# Patient Record
Sex: FEMALE | Race: BLACK OR AFRICAN AMERICAN | NOT HISPANIC OR LATINO | Employment: FULL TIME | RURAL
[De-identification: names, ages, dates, MRNs, and addresses within clinical notes are randomized per-mention and may not be internally consistent; named-entity substitution may affect disease eponyms.]

---

## 2019-01-31 ENCOUNTER — HISTORICAL (OUTPATIENT)
Dept: ADMINISTRATIVE | Facility: HOSPITAL | Age: 54
End: 2019-01-31

## 2019-02-01 LAB
LAB AP GENERAL CAT - HISTORICAL: NORMAL
LAB AP INTERPRETATION/RESULT - HISTORICAL: NEGATIVE
LAB AP SPECIMEN ADEQUACY - HISTORICAL: NORMAL
LAB AP SPECIMEN SUBMITTED - HISTORICAL: NORMAL

## 2020-11-16 ENCOUNTER — HISTORICAL (OUTPATIENT)
Dept: ADMINISTRATIVE | Facility: HOSPITAL | Age: 55
End: 2020-11-16

## 2020-12-15 ENCOUNTER — HISTORICAL (OUTPATIENT)
Dept: ADMINISTRATIVE | Facility: HOSPITAL | Age: 55
End: 2020-12-15

## 2020-12-15 LAB
25(OH)D3 SERPL-MCNC: 19.5 NG/ML
ALBUMIN SERPL BCP-MCNC: 4.1 G/DL (ref 3.5–5)
ALBUMIN/GLOB SERPL: 1.5 {RATIO}
ALP SERPL-CCNC: 110 U/L (ref 46–118)
ALT SERPL W P-5'-P-CCNC: 49 U/L (ref 13–56)
ANION GAP SERPL CALCULATED.3IONS-SCNC: 10.5 MMOL/L (ref 7–16)
AST SERPL W P-5'-P-CCNC: 29 U/L (ref 15–37)
BACTERIA #/AREA URNS HPF: ABNORMAL /HPF
BASOPHILS # BLD AUTO: 0.03 X10E3/UL (ref 0–0.2)
BASOPHILS NFR BLD AUTO: 0.6 % (ref 0–1)
BILIRUB SERPL-MCNC: 0.5 MG/DL (ref 0–1.2)
BILIRUB UR QL STRIP: NEGATIVE MG/DL
BUN SERPL-MCNC: 9 MG/DL (ref 7–18)
BUN/CREAT SERPL: 11
CALCIUM SERPL-MCNC: 9.2 MG/DL (ref 8.5–10.1)
CHLORIDE SERPL-SCNC: 108 MMOL/L (ref 98–107)
CHOLEST SERPL-MCNC: 197 MG/DL
CHOLEST/HDLC SERPL: 4.5 {RATIO}
CLARITY UR: CLEAR
CO2 SERPL-SCNC: 28 MMOL/L (ref 21–32)
COLOR UR: YELLOW
CREAT SERPL-MCNC: 0.81 MG/DL (ref 0.5–1.02)
EOSINOPHIL # BLD AUTO: 0.08 X10E3/UL (ref 0–0.5)
EOSINOPHIL NFR BLD AUTO: 1.7 % (ref 1–4)
ERYTHROCYTE [DISTWIDTH] IN BLOOD BY AUTOMATED COUNT: 12.9 % (ref 11.5–14.5)
GLOBULIN SER-MCNC: 2.7 G/DL (ref 2–4)
GLUCOSE SERPL-MCNC: 90 MG/DL (ref 74–106)
GLUCOSE UR STRIP-MCNC: NEGATIVE MG/DL
HCT VFR BLD AUTO: 41.4 % (ref 38–47)
HDLC SERPL-MCNC: 44 MG/DL
HGB BLD-MCNC: 13.5 G/DL (ref 12–16)
IMM GRANULOCYTES # BLD AUTO: 0.01 X10E3/UL (ref 0–0.04)
IMM GRANULOCYTES NFR BLD: 0.2 % (ref 0–0.4)
KETONES UR STRIP-SCNC: NEGATIVE MG/DL
LDLC SERPL CALC-MCNC: 119 MG/DL
LEUKOCYTE ESTERASE UR QL STRIP: NEGATIVE LEU/UL
LYMPHOCYTES # BLD AUTO: 1 X10E3/UL (ref 1–4.8)
LYMPHOCYTES NFR BLD AUTO: 21.6 % (ref 27–41)
MCH RBC QN AUTO: 28 PG (ref 27–31)
MCHC RBC AUTO-ENTMCNC: 32.6 G/DL (ref 32–36)
MCV RBC AUTO: 85.9 FL (ref 80–96)
MONOCYTES # BLD AUTO: 0.61 X10E3/UL (ref 0–0.8)
MONOCYTES NFR BLD AUTO: 13.2 % (ref 2–6)
MPC BLD CALC-MCNC: 10.4 FL (ref 9.4–12.4)
NEUTROPHILS # BLD AUTO: 2.9 X10E3/UL (ref 1.8–7.7)
NEUTROPHILS NFR BLD AUTO: 62.7 % (ref 53–65)
NITRITE UR QL STRIP: NEGATIVE
NRBC # BLD AUTO: 0 X10E3/UL (ref 0–0)
NRBC, AUTO (.00): 0 /100 (ref 0–0)
PH UR STRIP: 5.5 PH UNITS (ref 5–8)
PLATELET # BLD AUTO: 255 X10E3/UL (ref 150–400)
POTASSIUM SERPL-SCNC: 3.5 MMOL/L (ref 3.5–5.1)
PROT SERPL-MCNC: 6.8 G/DL (ref 6.4–8.2)
PROT UR QL STRIP: NEGATIVE MG/DL
RBC # BLD AUTO: 4.82 X10E6/UL (ref 4.2–5.4)
RBC # UR STRIP: NEGATIVE ERY/UL
SODIUM SERPL-SCNC: 143 MMOL/L (ref 136–145)
SP GR UR STRIP: 1.02 (ref 1–1.03)
SQUAMOUS #/AREA URNS LPF: ABNORMAL /LPF
TRIGL SERPL-MCNC: 170 MG/DL
UROBILINOGEN UR STRIP-ACNC: 0.2 EU/DL
WBC # BLD AUTO: 4.63 X10E3/UL (ref 4.5–11)
WBC #/AREA URNS HPF: ABNORMAL /HPF (ref 0–5)

## 2020-12-18 LAB
INSULIN SERPL-ACNC: NORMAL U[IU]/ML
LAB AP COMMENTS: NORMAL
LAB AP GENERAL CAT - HISTORICAL: NORMAL
LAB AP INTERPRETATION/RESULT - HISTORICAL: NEGATIVE
LAB AP SPECIMEN ADEQUACY - HISTORICAL: NORMAL
LAB AP SPECIMEN SUBMITTED - HISTORICAL: NORMAL

## 2021-08-17 ENCOUNTER — HOSPITAL ENCOUNTER (OUTPATIENT)
Dept: RADIOLOGY | Facility: HOSPITAL | Age: 56
Discharge: HOME | End: 2021-08-17
Attending: OBSTETRICS & GYNECOLOGY
Payer: COMMERCIAL

## 2021-08-17 VITALS — HEIGHT: 70 IN | WEIGHT: 215 LBS | BODY MASS INDEX: 30.78 KG/M2

## 2021-08-17 DIAGNOSIS — Z12.31 ENCOUNTER FOR SCREENING MAMMOGRAM FOR MALIGNANT NEOPLASM OF BREAST: ICD-10-CM

## 2021-08-17 PROCEDURE — 77067 SCR MAMMO BI INCL CAD: CPT | Mod: TC

## 2021-10-26 ENCOUNTER — OFFICE VISIT (OUTPATIENT)
Dept: FAMILY MEDICINE | Facility: CLINIC | Age: 56
End: 2021-10-26
Payer: COMMERCIAL

## 2021-10-26 VITALS
TEMPERATURE: 97 F | OXYGEN SATURATION: 99 % | HEART RATE: 70 BPM | RESPIRATION RATE: 18 BRPM | SYSTOLIC BLOOD PRESSURE: 116 MMHG | DIASTOLIC BLOOD PRESSURE: 73 MMHG

## 2021-10-26 DIAGNOSIS — Z20.828 CONTACT WITH OR EXPOSURE TO VIRAL DISEASE: Primary | ICD-10-CM

## 2021-10-26 DIAGNOSIS — J01.00 ACUTE NON-RECURRENT MAXILLARY SINUSITIS: ICD-10-CM

## 2021-10-26 LAB
CTP QC/QA: YES
FLUAV AG NPH QL: NEGATIVE
FLUBV AG NPH QL: NEGATIVE
SARS-COV-2 AG RESP QL IA.RAPID: NEGATIVE

## 2021-10-26 PROCEDURE — 96372 THER/PROPH/DIAG INJ SC/IM: CPT | Mod: ,,, | Performed by: NURSE PRACTITIONER

## 2021-10-26 PROCEDURE — 87428 POCT SARS-COV2 (COVID) WITH FLU ANTIGEN: ICD-10-PCS | Mod: QW,,, | Performed by: NURSE PRACTITIONER

## 2021-10-26 PROCEDURE — 3078F DIAST BP <80 MM HG: CPT | Mod: CPTII,,, | Performed by: NURSE PRACTITIONER

## 2021-10-26 PROCEDURE — 96372 PR INJECTION,THERAP/PROPH/DIAG2ST, IM OR SUBCUT: ICD-10-PCS | Mod: ,,, | Performed by: NURSE PRACTITIONER

## 2021-10-26 PROCEDURE — 3078F PR MOST RECENT DIASTOLIC BLOOD PRESSURE < 80 MM HG: ICD-10-PCS | Mod: CPTII,,, | Performed by: NURSE PRACTITIONER

## 2021-10-26 PROCEDURE — 99203 PR OFFICE/OUTPT VISIT, NEW, LEVL III, 30-44 MIN: ICD-10-PCS | Mod: 25,,, | Performed by: NURSE PRACTITIONER

## 2021-10-26 PROCEDURE — 3074F PR MOST RECENT SYSTOLIC BLOOD PRESSURE < 130 MM HG: ICD-10-PCS | Mod: CPTII,,, | Performed by: NURSE PRACTITIONER

## 2021-10-26 PROCEDURE — 3074F SYST BP LT 130 MM HG: CPT | Mod: CPTII,,, | Performed by: NURSE PRACTITIONER

## 2021-10-26 PROCEDURE — 1159F PR MEDICATION LIST DOCUMENTED IN MEDICAL RECORD: ICD-10-PCS | Mod: CPTII,,, | Performed by: NURSE PRACTITIONER

## 2021-10-26 PROCEDURE — 87428 SARSCOV & INF VIR A&B AG IA: CPT | Mod: QW,,, | Performed by: NURSE PRACTITIONER

## 2021-10-26 PROCEDURE — 99203 OFFICE O/P NEW LOW 30 MIN: CPT | Mod: 25,,, | Performed by: NURSE PRACTITIONER

## 2021-10-26 PROCEDURE — 1159F MED LIST DOCD IN RCRD: CPT | Mod: CPTII,,, | Performed by: NURSE PRACTITIONER

## 2021-10-26 RX ORDER — CEFDINIR 300 MG/1
300 CAPSULE ORAL 2 TIMES DAILY
Qty: 20 CAPSULE | Refills: 0 | Status: SHIPPED | OUTPATIENT
Start: 2021-10-26 | End: 2021-11-05

## 2021-10-26 RX ORDER — DEXBROMPHENIRAMINE MALEATE, PHENYLEPHRINE HYDROCHLORIDE 2; 7.5 MG/1; MG/1
1 TABLET ORAL
Qty: 20 TABLET | Refills: 0 | Status: SHIPPED | OUTPATIENT
Start: 2021-10-26 | End: 2023-04-10

## 2021-10-26 RX ORDER — DEXAMETHASONE SODIUM PHOSPHATE 4 MG/ML
6 INJECTION, SOLUTION INTRA-ARTICULAR; INTRALESIONAL; INTRAMUSCULAR; INTRAVENOUS; SOFT TISSUE ONCE
Status: COMPLETED | OUTPATIENT
Start: 2021-10-26 | End: 2021-10-26

## 2021-10-26 RX ORDER — CEFTRIAXONE 1 G/1
1 INJECTION, POWDER, FOR SOLUTION INTRAMUSCULAR; INTRAVENOUS
Status: COMPLETED | OUTPATIENT
Start: 2021-10-26 | End: 2021-10-26

## 2021-10-26 RX ADMIN — CEFTRIAXONE 1 G: 1 INJECTION, POWDER, FOR SOLUTION INTRAMUSCULAR; INTRAVENOUS at 09:10

## 2021-10-26 RX ADMIN — DEXAMETHASONE SODIUM PHOSPHATE 6 MG: 4 INJECTION, SOLUTION INTRA-ARTICULAR; INTRALESIONAL; INTRAMUSCULAR; INTRAVENOUS; SOFT TISSUE at 09:10

## 2022-01-31 PROBLEM — J01.00 ACUTE NON-RECURRENT MAXILLARY SINUSITIS: Status: RESOLVED | Noted: 2021-10-26 | Resolved: 2022-01-31

## 2023-04-10 ENCOUNTER — OFFICE VISIT (OUTPATIENT)
Dept: FAMILY MEDICINE | Facility: CLINIC | Age: 58
End: 2023-04-10
Payer: COMMERCIAL

## 2023-04-10 VITALS
WEIGHT: 203 LBS | DIASTOLIC BLOOD PRESSURE: 81 MMHG | OXYGEN SATURATION: 99 % | SYSTOLIC BLOOD PRESSURE: 125 MMHG | TEMPERATURE: 99 F | BODY MASS INDEX: 29.06 KG/M2 | RESPIRATION RATE: 20 BRPM | HEART RATE: 75 BPM | HEIGHT: 70 IN

## 2023-04-10 DIAGNOSIS — R63.4 WEIGHT LOSS: ICD-10-CM

## 2023-04-10 DIAGNOSIS — Z23 NEED FOR PNEUMOCOCCAL VACCINE: ICD-10-CM

## 2023-04-10 DIAGNOSIS — F32.A DEPRESSIVE DISORDER: Primary | ICD-10-CM

## 2023-04-10 DIAGNOSIS — F17.210 LIGHT CIGARETTE SMOKER (1-9 CIGARETTES PER DAY): ICD-10-CM

## 2023-04-10 DIAGNOSIS — Z23 FLU VACCINE NEED: ICD-10-CM

## 2023-04-10 DIAGNOSIS — Z11.59 NEED FOR HEPATITIS C SCREENING TEST: ICD-10-CM

## 2023-04-10 DIAGNOSIS — R53.83 OTHER FATIGUE: ICD-10-CM

## 2023-04-10 LAB
ALBUMIN SERPL BCP-MCNC: 3.8 G/DL (ref 3.5–5)
ALBUMIN/GLOB SERPL: 1.5 {RATIO}
ALP SERPL-CCNC: 115 U/L (ref 46–118)
ALT SERPL W P-5'-P-CCNC: 40 U/L (ref 13–56)
ANION GAP SERPL CALCULATED.3IONS-SCNC: 9 MMOL/L (ref 7–16)
AST SERPL W P-5'-P-CCNC: 34 U/L (ref 15–37)
BASOPHILS # BLD AUTO: 0.03 K/UL (ref 0–0.2)
BASOPHILS NFR BLD AUTO: 0.5 % (ref 0–1)
BILIRUB SERPL-MCNC: 0.3 MG/DL (ref ?–1.2)
BUN SERPL-MCNC: 11 MG/DL (ref 7–18)
BUN/CREAT SERPL: 17 (ref 6–20)
CALCIUM SERPL-MCNC: 9.2 MG/DL (ref 8.5–10.1)
CHLORIDE SERPL-SCNC: 112 MMOL/L (ref 98–107)
CO2 SERPL-SCNC: 28 MMOL/L (ref 21–32)
CREAT SERPL-MCNC: 0.66 MG/DL (ref 0.55–1.02)
DIFFERENTIAL METHOD BLD: ABNORMAL
EGFR (NO RACE VARIABLE) (RUSH/TITUS): 101 ML/MIN/1.73M²
EOSINOPHIL # BLD AUTO: 0.11 K/UL (ref 0–0.5)
EOSINOPHIL NFR BLD AUTO: 1.9 % (ref 1–4)
ERYTHROCYTE [DISTWIDTH] IN BLOOD BY AUTOMATED COUNT: 12.4 % (ref 11.5–14.5)
GLOBULIN SER-MCNC: 2.5 G/DL (ref 2–4)
GLUCOSE SERPL-MCNC: 90 MG/DL (ref 74–106)
HCT VFR BLD AUTO: 43.4 % (ref 38–47)
HCV AB SER QL: NORMAL
HGB BLD-MCNC: 14.4 G/DL (ref 12–16)
IMM GRANULOCYTES # BLD AUTO: 0.02 K/UL (ref 0–0.04)
IMM GRANULOCYTES NFR BLD: 0.4 % (ref 0–0.4)
LYMPHOCYTES # BLD AUTO: 1.96 K/UL (ref 1–4.8)
LYMPHOCYTES NFR BLD AUTO: 34.4 % (ref 27–41)
MCH RBC QN AUTO: 28.5 PG (ref 27–31)
MCHC RBC AUTO-ENTMCNC: 33.2 G/DL (ref 32–36)
MCV RBC AUTO: 85.8 FL (ref 80–96)
MONOCYTES # BLD AUTO: 0.44 K/UL (ref 0–0.8)
MONOCYTES NFR BLD AUTO: 7.7 % (ref 2–6)
MPC BLD CALC-MCNC: 10.1 FL (ref 9.4–12.4)
NEUTROPHILS # BLD AUTO: 3.13 K/UL (ref 1.8–7.7)
NEUTROPHILS NFR BLD AUTO: 55.1 % (ref 53–65)
NRBC # BLD AUTO: 0 X10E3/UL
NRBC, AUTO (.00): 0 %
PLATELET # BLD AUTO: 261 K/UL (ref 150–400)
POTASSIUM SERPL-SCNC: 4.1 MMOL/L (ref 3.5–5.1)
PROT SERPL-MCNC: 6.3 G/DL (ref 6.4–8.2)
RBC # BLD AUTO: 5.06 M/UL (ref 4.2–5.4)
SODIUM SERPL-SCNC: 145 MMOL/L (ref 136–145)
TSH SERPL DL<=0.005 MIU/L-ACNC: 2.01 UIU/ML (ref 0.36–3.74)
WBC # BLD AUTO: 5.69 K/UL (ref 4.5–11)

## 2023-04-10 PROCEDURE — 86803 HEPATITIS C ANTIBODY: ICD-10-PCS | Mod: ,,, | Performed by: CLINICAL MEDICAL LABORATORY

## 2023-04-10 PROCEDURE — 1160F RVW MEDS BY RX/DR IN RCRD: CPT | Mod: ,,, | Performed by: NURSE PRACTITIONER

## 2023-04-10 PROCEDURE — 1160F PR REVIEW ALL MEDS BY PRESCRIBER/CLIN PHARMACIST DOCUMENTED: ICD-10-PCS | Mod: ,,, | Performed by: NURSE PRACTITIONER

## 2023-04-10 PROCEDURE — 80050 GENERAL HEALTH PANEL: CPT | Mod: ,,, | Performed by: CLINICAL MEDICAL LABORATORY

## 2023-04-10 PROCEDURE — 90472 PNEUMOCOCCAL CONJUGATE VACCINE 20-VALENT: ICD-10-PCS | Mod: ,,, | Performed by: NURSE PRACTITIONER

## 2023-04-10 PROCEDURE — 3008F BODY MASS INDEX DOCD: CPT | Mod: ,,, | Performed by: NURSE PRACTITIONER

## 2023-04-10 PROCEDURE — 90472 IMMUNIZATION ADMIN EACH ADD: CPT | Mod: ,,, | Performed by: NURSE PRACTITIONER

## 2023-04-10 PROCEDURE — 3074F PR MOST RECENT SYSTOLIC BLOOD PRESSURE < 130 MM HG: ICD-10-PCS | Mod: ,,, | Performed by: NURSE PRACTITIONER

## 2023-04-10 PROCEDURE — 99214 OFFICE O/P EST MOD 30 MIN: CPT | Mod: 25,,, | Performed by: NURSE PRACTITIONER

## 2023-04-10 PROCEDURE — 80050 PR  GENERAL HEALTH PANEL: ICD-10-PCS | Mod: ,,, | Performed by: CLINICAL MEDICAL LABORATORY

## 2023-04-10 PROCEDURE — 3079F DIAST BP 80-89 MM HG: CPT | Mod: ,,, | Performed by: NURSE PRACTITIONER

## 2023-04-10 PROCEDURE — 99214 PR OFFICE/OUTPT VISIT, EST, LEVL IV, 30-39 MIN: ICD-10-PCS | Mod: 25,,, | Performed by: NURSE PRACTITIONER

## 2023-04-10 PROCEDURE — 90686 IIV4 VACC NO PRSV 0.5 ML IM: CPT | Mod: ,,, | Performed by: NURSE PRACTITIONER

## 2023-04-10 PROCEDURE — 90677 PCV20 VACCINE IM: CPT | Mod: ,,, | Performed by: NURSE PRACTITIONER

## 2023-04-10 PROCEDURE — 1159F PR MEDICATION LIST DOCUMENTED IN MEDICAL RECORD: ICD-10-PCS | Mod: ,,, | Performed by: NURSE PRACTITIONER

## 2023-04-10 PROCEDURE — 3008F PR BODY MASS INDEX (BMI) DOCUMENTED: ICD-10-PCS | Mod: ,,, | Performed by: NURSE PRACTITIONER

## 2023-04-10 PROCEDURE — 90471 IMMUNIZATION ADMIN: CPT | Mod: ,,, | Performed by: NURSE PRACTITIONER

## 2023-04-10 PROCEDURE — 3079F PR MOST RECENT DIASTOLIC BLOOD PRESSURE 80-89 MM HG: ICD-10-PCS | Mod: ,,, | Performed by: NURSE PRACTITIONER

## 2023-04-10 PROCEDURE — 90686 FLU VACCINE (QUAD) GREATER THAN OR EQUAL TO 3YO PRESERVATIVE FREE IM: ICD-10-PCS | Mod: ,,, | Performed by: NURSE PRACTITIONER

## 2023-04-10 PROCEDURE — 3074F SYST BP LT 130 MM HG: CPT | Mod: ,,, | Performed by: NURSE PRACTITIONER

## 2023-04-10 PROCEDURE — 86803 HEPATITIS C AB TEST: CPT | Mod: ,,, | Performed by: CLINICAL MEDICAL LABORATORY

## 2023-04-10 PROCEDURE — 90471 FLU VACCINE (QUAD) GREATER THAN OR EQUAL TO 3YO PRESERVATIVE FREE IM: ICD-10-PCS | Mod: ,,, | Performed by: NURSE PRACTITIONER

## 2023-04-10 PROCEDURE — 1159F MED LIST DOCD IN RCRD: CPT | Mod: ,,, | Performed by: NURSE PRACTITIONER

## 2023-04-10 PROCEDURE — 90677 PNEUMOCOCCAL CONJUGATE VACCINE 20-VALENT: ICD-10-PCS | Mod: ,,, | Performed by: NURSE PRACTITIONER

## 2023-04-10 RX ORDER — BUPROPION HYDROCHLORIDE 150 MG/1
150 TABLET ORAL DAILY
Qty: 30 TABLET | Refills: 1 | Status: SHIPPED | OUTPATIENT
Start: 2023-04-10 | End: 2023-05-25 | Stop reason: DRUGHIGH

## 2023-04-10 NOTE — PATIENT INSTRUCTIONS
"Patient Education       Quitting Smoking   The Basics   Written by the doctors and editors at [facility]   What are the benefits of quitting smoking? -- Quitting smoking can dramatically improve your health and help you live longer. It lowers your risk of heart disease, lung disease, kidney failure, infection, and cancer.   Quitting smoking can also lower your chances of getting osteoporosis, a condition that makes your bones weak. Plus, it can help your skin look younger and reduce the chances that you will have problems with sex.  Quitting is not easy for most people, and it can take several tries to completely quit. But help and support are available. Quitting smoking will improve your health no matter how old you are, even if you have smoked for a long time.   What should I do if I want to quit smoking? -- It's a good idea to start by talking with your doctor or nurse. It is possible to quit on your own, without help. But getting help greatly increases your chances of quitting successfully.  When you are ready to quit, you will make a plan to:  Set a quit date  Tell your family and friends that you plan to quit  Plan ahead for the challenges you will face, such as cigarette cravings  Remove cigarettes from your home, car, and work  How can my doctor or nurse help? -- Your doctor or nurse can give you advice on the best way to quit. They can also give you medicines to:  Reduce your craving for cigarettes  Reduce your "withdrawal" symptoms (symptoms that happen when you stop smoking)  Your doctor or nurse can also help you find a counselor to talk to. For most people who are trying to quit smoking, it works best to use both medicines and counseling.  You can also get help from a free phone line (7-950-QUIT-NOW or 1-652.833.7436) or go online to www.smokefree.gov.  What are the symptoms of withdrawal? -- When you stop smoking, you might have symptoms such as:  Trouble sleeping  Feeling irritable, anxious, or " restless  Getting frustrated or angry  Having trouble thinking clearly  These symptoms can be hard to deal with, which is why it can be so hard to quit. But medicines can help.  Some people who stop smoking become temporarily depressed. Some people need treatment for depression, such as counseling or medicines or both. People with depression might:  No longer enjoy or care about doing the things they used to like to do  Feel sad, down, hopeless, nervous, or cranky most of the day, almost every day  Lose or gain weight  Sleep too much or too little  Feel tired or like they have no energy  Feel guilty or like they are worth nothing  Forget things or feel confused  Move and speak more slowly than usual  Act restless or have trouble staying still  Think about death or suicide  If you think you might be depressed, tell your doctor or nurse right away. They can talk to you about your symptoms and recommend treatment if needed.  If you ever feel like you might hurt yourself, go straight to the nearest emergency department or call for an ambulance (in the [place] and [place], dial 9-1-1). You can also call your doctor or nurse and tell them it is an emergency, or reach the  National Suicide Prevention Lifeline at 1-939.118.2406 or www.suicidepreventionlifeline.org.  How does counseling work? -- A counselor can help you figure out:  What triggers you to want to smoke, and how to handle these situations  How to resist cravings  What you can do differently if you have tried to quit before  You can meet with a counselor in one-on-one sessions or as part of a group. There are other ways to get counseling, too, such as over the phone, through text messaging, or online.   How do medicines help you stop smoking? -- Different medicines work in different ways:  Nicotine replacement therapy - Nicotine is the main drug in cigarettes, and the reason they are addictive. These medicines reduce your body's craving for nicotine. They also help  "with withdrawal symptoms.  There are different forms of nicotine replacement, including skin patches, lozenges, gum, nasal sprays, and inhalers. Most can be bought without a prescription. Also, health insurance might cover some or all of the cost.  It often helps to use 2 forms of nicotine replacement. For example, you might wear a patch all the time, plus use gum or lozenges when you get a craving to smoke.  Varenicline - Varenicline (brand names: Chantix, Champix) is a prescription medicine that reduces withdrawal symptoms and cigarette cravings. Varenicline can increase the effects of alcohol in some people. It's a good idea to limit drinking while you're taking it, at least until you know how it affects you.  Even if you are not yet ready to commit to a quit date, varenicline can help reduce cravings. This can make it easier to quit when you are ready.  Bupropion - Bupropion (sample brand names: Wellbutrin, Zyban) is a prescription medicine that reduces your desire to smoke. It is also available in a generic version, which is cheaper than the brand name medicines. Doctors do not usually prescribe bupropion for people with seizures or who have had seizures in the past.  It might also be helpful to combine nicotine replacement with bupropion or varenicline. In some cases, a person might even take both bupropion and varenicline. Your doctor or nurse can help you figure out the best combination for you.  What about e-cigarettes? -- Sometimes people wonder if using electronic cigarettes, or "e-cigarettes," can help them quit smoking. Using e-cigarettes is also called "vaping." Doctors do not recommend e-cigarettes in place of medicines and counseling. That's because e-cigarettes still contain nicotine as well as other substances that might be harmful. It's also not clear how they can affect a person's health in the long term.  What if I am pregnant and I smoke? -- If you are pregnant, it's really important for the " health of your baby that you quit. Ask your doctor what options you have, and what is safest for your baby.  Will I gain weight if I quit? -- You might gain a few pounds. This can be frustrating for some people, but it's important to remember that you are improving your health by quitting smoking. You can help prevent gaining a lot of weight by staying active and eating a healthy diet.  What if I am not able to quit? -- If you don't quit on your first try, or if you quit but then start smoking again, don't give up hope. Lots of people have to try more than once before they are able to completely quit.  It might help to try to understand why quitting did not work. There might be something you can do differently when you try again. It can help to figure out what situations make you want to smoke, so you can avoid them.   What else can I do to improve my chances of quitting? -- You can:  Get regular exercise. Any type of physical activity, even gentle forms of movement, is good for your health. Physical activity can also help reduce stress.  Stay away from smokers and places that make you want to smoke. If people close to you smoke, ask them to quit with you or avoid smoking around you.  Carry gum, hard candy, or something to put in your mouth. If you get a craving for a cigarette, try one of these instead.  Don't give up, even if you start smoking again. It takes most people a few tries before they succeed.  All topics are updated as new evidence becomes available and our peer review process is complete.  This topic retrieved from [employer] on: Sep 21, 2021.  Topic 59494 Version 22.0  Release: 29.4.2 - C29.263  © 2021 UpToDate, Inc. and/or its affiliates. All rights reserved.  Consumer Information Use and Disclaimer   This information is not specific medical advice and does not replace information you receive from your health care provider. This is only a brief summary of general information. It does NOT include all  information about conditions, illnesses, injuries, tests, procedures, treatments, therapies, discharge instructions or life-style choices that may apply to you. You must talk with your health care provider for complete information about your health and treatment options. This information should not be used to decide whether or not to accept your health care provider's advice, instructions or recommendations. Only your health care provider has the knowledge and training to provide advice that is right for you. The use of this information is governed by the [employer] End User License Agreement, available at [email or web address].The use of [employer] content is governed by the [employer] Terms of Use. ©2021 [employer] All rights reserved.  Copyright   © 2021 UpToDate, Inc. and/or its affiliates. All rights reserved.

## 2023-04-10 NOTE — PROGRESS NOTES
[facility] FAMILY MEDICINE       PATIENT NAME: Imelda Anderson   : 1965    AGE: 57 y.o. DATE OF ENCOUNTER: 4/10/23    MRN: [id number]      PCP: Daron Casillas MD    Reason for Visit / Chief Complaint:  Establish Care (Patient presents to the clinic establish care and  last 2wks dizziness loss weight)         274}    Subjective:     HPI:    Presents to establish PCP.  Last labs > 2 yrs ago per Dr. Casillas.  Brother  2022 & Mom  2022; has been a struggle since then with depression & sadness.  Bought new mobile home but having a lot of probs w/ it, has been stressful.  Over past couple of weeks, having dizzy spells, losing wt.  Doesn't want to do anything on days off.  Thoughts of dying but no thoughts or plans of suicide.  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  Had quit smoking x 2 yrs & started back after mom passed.  PHQ-9 Score: 14    Review of Systems:   Review of Systems   Constitutional:  Positive for appetite change, fatigue and unexpected weight change.   HENT: Negative.     Respiratory: Negative.     Cardiovascular: Negative.    Gastrointestinal: Negative.    Genitourinary: Negative.    Skin: Negative.    Neurological:  Positive for dizziness. Negative for headaches (occasionally).   Psychiatric/Behavioral:  Positive for dysphoric mood. Negative for self-injury and suicidal ideas. The patient is nervous/anxious.      Allergies and Meds: 274}   Review of patient's allergies indicates:  No Known Allergies     Current Outpatient Medications:     buPROPion (WELLBUTRIN XL) 150 MG TB24 tablet, Take 1 tablet (150 mg total) by mouth once daily., Disp: 30 tablet, Rfl: 1    Labs:274}    I have reviewed old labs below:  Lab Results   Component Value Date    WBC 4.63 12/15/2020    RBC 4.82 12/15/2020    HGB 13.5 12/15/2020    HCT 41.4 12/15/2020     12/15/2020     12/15/2020    K 3.5 12/15/2020     (H) 12/15/2020    CALCIUM 9.2 12/15/2020    GLU 90 12/15/2020  "   BUN 9 12/15/2020    CREATININE 0.810 12/15/2020    ESTGFRAFRICA 94 12/15/2020    EGFRNONAA 77 12/15/2020    ALT 49 12/15/2020    AST 29 12/15/2020    CHOL 197 12/15/2020    TRIG 170 12/15/2020    HDL 44 12/15/2020    LDLCALC 119 12/15/2020       Medical History: 274}   History reviewed. No pertinent past medical history.   Social History     Tobacco Use   Smoking Status Every Day    Packs/day: 0.25    Years: 15.00    Pack years: 3.75    Types: Cigarettes   Smokeless Tobacco Current      Past Surgical History:   Procedure Laterality Date    HYSTERECTOMY      TUBAL LIGATION  2010      Objective:  274}   /81 (BP Location: Left arm, Patient Position: Sitting, BP Method: Medium (Manual))   Pulse 75   Temp 98.6 °F (37 °C) (Oral)   Resp 20   Ht 5' 10" (1.778 m)   Wt 92.1 kg (203 lb)   SpO2 99%   BMI 29.13 kg/m²     Wt Readings from Last 3 Encounters:   04/10/23 92.1 kg (203 lb)   08/17/21 97.5 kg (215 lb)     BP Readings from Last 3 Encounters:   04/10/23 125/81   10/26/21 116/73     Body mass index is 29.13 kg/m².     Physical Exam  Vitals and nursing note reviewed.   Constitutional:       General: She is not in acute distress.     Appearance: Normal appearance.   HENT:      Head: Normocephalic.      Right Ear: Tympanic membrane, ear canal and external ear normal.      Left Ear: Tympanic membrane, ear canal and external ear normal.      Nose: Nose normal.      Mouth/Throat:      Mouth: Mucous membranes are moist.      Pharynx: Oropharynx is clear.   Eyes:      Conjunctiva/sclera: Conjunctivae normal.      Pupils: Pupils are equal, round, and reactive to light.   Neck:      Thyroid: No thyroid mass or thyromegaly.      Vascular: Normal carotid pulses. No carotid bruit.   Cardiovascular:      Rate and Rhythm: Normal rate and regular rhythm.      Pulses: Normal pulses.      Heart sounds: Normal heart sounds.   Pulmonary:      Effort: Pulmonary effort is normal.      Breath sounds: Normal breath sounds. "   Abdominal:      Palpations: Abdomen is soft.      Tenderness: There is no abdominal tenderness.   Musculoskeletal:      Cervical back: Neck supple.      Right lower leg: No edema.      Left lower leg: No edema.   Lymphadenopathy:      Cervical: No cervical adenopathy.   Skin:     General: Skin is warm and dry.   Neurological:      General: No focal deficit present.      Mental Status: She is alert and oriented to person, place, and time.   Psychiatric:         Mood and Affect: Mood normal.         Behavior: Behavior normal.        Assessment and Plan: 274}     1. Depressive disorder  Comments:  Start Wellbutrin  mg every morning, f/u 4-6 weeks.  Orders:  -     CBC Auto Differential; Future; Expected date: 04/10/2023  -     Comprehensive Metabolic Panel; Future; Expected date: 04/10/2023  -     TSH; Future; Expected date: 04/10/2023  -     buPROPion (WELLBUTRIN XL) 150 MG TB24 tablet; Take 1 tablet (150 mg total) by mouth once daily.  Dispense: 30 tablet; Refill: 1    2. Other fatigue  -     CBC Auto Differential; Future; Expected date: 04/10/2023  -     Comprehensive Metabolic Panel; Future; Expected date: 04/10/2023  -     TSH; Future; Expected date: 04/10/2023  -     X-Ray Chest PA And Lateral; Future; Expected date: 04/10/2023    3. Weight loss  -     CBC Auto Differential; Future; Expected date: 04/10/2023  -     Comprehensive Metabolic Panel; Future; Expected date: 04/10/2023  -     X-Ray Chest PA And Lateral; Future; Expected date: 04/10/2023    4. Need for hepatitis C screening test  -     Hepatitis C Antibody; Future; Expected date: 04/10/2023    5. Light cigarette smoker (1-9 cigarettes per day)  -     X-Ray Chest PA And Lateral; Future; Expected date: 04/10/2023    6. Need for pneumococcal vaccine  -     Pneumococcal Conjugate Vaccine (20 Valent) (IM)    7. Flu vaccine need  -     Influenza - Quadrivalent (PF)    Declines HIV screening.  Advised to quit smoking.  Prevnar-20 today  Flu vaccine  today  Took 1st Moderna vaccines & 1 booster, not sure about getting further booster doses (doesn't have card with her).  Not UTD on mammo, no previous colonoscopy.  Will address other care gaps at next visit.    Return to clinic 4-6 wks f/u depression, start of wellbutrin/Healthy You with fasting lab (could fast after bfast); and sooner as needed.    Future Appointments   Date Time Provider Department Center   5/25/2023  3:40 PM AMY Victoria [facility] SALLY Cruz          Signature:  AMY [facility]

## 2023-05-24 ENCOUNTER — TELEPHONE (OUTPATIENT)
Dept: FAMILY MEDICINE | Facility: CLINIC | Age: 58
End: 2023-05-24
Payer: COMMERCIAL

## 2023-05-24 DIAGNOSIS — Z13.220 SCREENING FOR HYPERLIPIDEMIA: Primary | ICD-10-CM

## 2023-05-24 DIAGNOSIS — Z13.1 DIABETES MELLITUS SCREENING: ICD-10-CM

## 2023-05-24 NOTE — TELEPHONE ENCOUNTER
----- Message from Parris Acosta sent at 5/24/2023  1:48 PM CDT -----  Pt coming in the morning to get lab Work done before appt

## 2023-05-25 ENCOUNTER — OFFICE VISIT (OUTPATIENT)
Dept: FAMILY MEDICINE | Facility: CLINIC | Age: 58
End: 2023-05-25
Payer: COMMERCIAL

## 2023-05-25 VITALS
OXYGEN SATURATION: 97 % | WEIGHT: 201.38 LBS | BODY MASS INDEX: 28.83 KG/M2 | DIASTOLIC BLOOD PRESSURE: 69 MMHG | TEMPERATURE: 98 F | RESPIRATION RATE: 20 BRPM | SYSTOLIC BLOOD PRESSURE: 115 MMHG | HEART RATE: 78 BPM | HEIGHT: 70 IN

## 2023-05-25 DIAGNOSIS — Z23 NEED FOR DIPHTHERIA-TETANUS-PERTUSSIS (TDAP) VACCINE: ICD-10-CM

## 2023-05-25 DIAGNOSIS — Z13.1 SCREENING FOR DIABETES MELLITUS: ICD-10-CM

## 2023-05-25 DIAGNOSIS — Z12.11 SCREENING FOR MALIGNANT NEOPLASM OF COLON: ICD-10-CM

## 2023-05-25 DIAGNOSIS — Z13.220 SCREENING FOR LIPOID DISORDERS: ICD-10-CM

## 2023-05-25 DIAGNOSIS — Z12.31 BREAST CANCER SCREENING BY MAMMOGRAM: ICD-10-CM

## 2023-05-25 DIAGNOSIS — Z12.11 SCREENING FOR COLON CANCER: Primary | ICD-10-CM

## 2023-05-25 DIAGNOSIS — Z00.00 ROUTINE GENERAL MEDICAL EXAMINATION AT A HEALTH CARE FACILITY: Primary | ICD-10-CM

## 2023-05-25 DIAGNOSIS — F32.A DEPRESSIVE DISORDER: ICD-10-CM

## 2023-05-25 PROCEDURE — 90715 TDAP VACCINE GREATER THAN OR EQUAL TO 7YO IM: ICD-10-PCS | Mod: ,,, | Performed by: NURSE PRACTITIONER

## 2023-05-25 PROCEDURE — 3008F BODY MASS INDEX DOCD: CPT | Mod: ,,, | Performed by: NURSE PRACTITIONER

## 2023-05-25 PROCEDURE — 1159F MED LIST DOCD IN RCRD: CPT | Mod: ,,, | Performed by: NURSE PRACTITIONER

## 2023-05-25 PROCEDURE — 99396 PR PREVENTIVE VISIT,EST,40-64: ICD-10-PCS | Mod: 25,,, | Performed by: NURSE PRACTITIONER

## 2023-05-25 PROCEDURE — 3078F PR MOST RECENT DIASTOLIC BLOOD PRESSURE < 80 MM HG: ICD-10-PCS | Mod: ,,, | Performed by: NURSE PRACTITIONER

## 2023-05-25 PROCEDURE — 3044F PR MOST RECENT HEMOGLOBIN A1C LEVEL <7.0%: ICD-10-PCS | Mod: ,,, | Performed by: NURSE PRACTITIONER

## 2023-05-25 PROCEDURE — 3078F DIAST BP <80 MM HG: CPT | Mod: ,,, | Performed by: NURSE PRACTITIONER

## 2023-05-25 PROCEDURE — 90471 TDAP VACCINE GREATER THAN OR EQUAL TO 7YO IM: ICD-10-PCS | Mod: ,,, | Performed by: NURSE PRACTITIONER

## 2023-05-25 PROCEDURE — 3044F HG A1C LEVEL LT 7.0%: CPT | Mod: ,,, | Performed by: NURSE PRACTITIONER

## 2023-05-25 PROCEDURE — 3074F SYST BP LT 130 MM HG: CPT | Mod: ,,, | Performed by: NURSE PRACTITIONER

## 2023-05-25 PROCEDURE — 99396 PREV VISIT EST AGE 40-64: CPT | Mod: 25,,, | Performed by: NURSE PRACTITIONER

## 2023-05-25 PROCEDURE — 1160F PR REVIEW ALL MEDS BY PRESCRIBER/CLIN PHARMACIST DOCUMENTED: ICD-10-PCS | Mod: ,,, | Performed by: NURSE PRACTITIONER

## 2023-05-25 PROCEDURE — 90715 TDAP VACCINE 7 YRS/> IM: CPT | Mod: ,,, | Performed by: NURSE PRACTITIONER

## 2023-05-25 PROCEDURE — 3008F PR BODY MASS INDEX (BMI) DOCUMENTED: ICD-10-PCS | Mod: ,,, | Performed by: NURSE PRACTITIONER

## 2023-05-25 PROCEDURE — 3074F PR MOST RECENT SYSTOLIC BLOOD PRESSURE < 130 MM HG: ICD-10-PCS | Mod: ,,, | Performed by: NURSE PRACTITIONER

## 2023-05-25 PROCEDURE — 90471 IMMUNIZATION ADMIN: CPT | Mod: ,,, | Performed by: NURSE PRACTITIONER

## 2023-05-25 PROCEDURE — 1160F RVW MEDS BY RX/DR IN RCRD: CPT | Mod: ,,, | Performed by: NURSE PRACTITIONER

## 2023-05-25 PROCEDURE — 1159F PR MEDICATION LIST DOCUMENTED IN MEDICAL RECORD: ICD-10-PCS | Mod: ,,, | Performed by: NURSE PRACTITIONER

## 2023-05-25 RX ORDER — BUPROPION HYDROCHLORIDE 300 MG/1
300 TABLET ORAL DAILY
Qty: 90 TABLET | Refills: 1 | Status: SHIPPED | OUTPATIENT
Start: 2023-05-25 | End: 2023-11-21

## 2023-05-25 NOTE — PROGRESS NOTES
"[facility] MEDICINE       PATIENT NAME: Imelda Anderson   : 1965    AGE: 57 y.o. DATE OF ENCOUNTER: 23    MRN: [id number]      Subjective     Patient ID: Imelda Anderson is a 57 y.o. female.    Chief Complaint: Healthy You (Patient presents to clinic for healthy you visit./Shaking spells 3 times during this week when eating it helps she says it could be sugar./'Keeping a headache")    HPI  Healthy You, no [facility] benefits; had fasting labs this a.m. but results are not back at the time of her visit.    6 week f/u start of Wellbutrin for depression - is improving, but going through a lot of change at work, is just trying to deal with it    C/o 3 times in past week episode of shakiness which improves with eating something    Review of Systems   Constitutional:  Positive for appetite change and fatigue.   Respiratory: Negative.     Cardiovascular: Negative.    Gastrointestinal: Negative.    Neurological: Negative.    Psychiatric/Behavioral:  Positive for dysphoric mood. Negative for self-injury and suicidal ideas. The patient is nervous/anxious.      Tobacco Use: High Risk    Smoking Tobacco Use: Every Day    Smokeless Tobacco Use: Current    Passive Exposure: Current     Review of patient's allergies indicates:  No Known Allergies  Current Outpatient Medications   Medication Instructions    buPROPion (WELLBUTRIN XL) 300 mg, Oral, Daily     Medications Discontinued During This Encounter   Medication Reason    buPROPion (WELLBUTRIN XL) 150 MG TB24 tablet Dose adjustment       History reviewed. No pertinent past medical history.  Health Maintenance Topics with due status: Not Due       Topic Last Completion Date    TETANUS VACCINE 2023    Hemoglobin A1c (Diabetic Prevention Screening) 2023    Lipid Panel 2023     Immunization History   Administered Date(s) Administered    Influenza - Quadrivalent - PF *Preferred* (6 months and older) 04/10/2023    Pneumococcal Conjugate - 20 " "Valent 04/10/2023    Tdap 05/25/2023       Objective     Body mass index is 28.9 kg/m².  Wt Readings from Last 3 Encounters:   05/25/23 91.4 kg (201 lb 6.4 oz)   04/10/23 92.1 kg (203 lb)   08/17/21 97.5 kg (215 lb)     Ht Readings from Last 3 Encounters:   05/25/23 5' 10" (1.778 m)   04/10/23 5' 10" (1.778 m)   08/17/21 5' 10" (1.778 m)     BP Readings from Last 3 Encounters:   05/25/23 115/69   04/10/23 125/81   10/26/21 116/73     Temp Readings from Last 3 Encounters:   05/25/23 98 °F (36.7 °C) (Oral)   04/10/23 98.6 °F (37 °C) (Oral)   10/26/21 97.3 °F (36.3 °C) (Oral)     Pulse Readings from Last 3 Encounters:   05/25/23 78   04/10/23 75   10/26/21 70     Resp Readings from Last 3 Encounters:   05/25/23 20   04/10/23 20   10/26/21 18     PF Readings from Last 3 Encounters:   No data found for PF       Physical Exam  Vitals and nursing note reviewed.   Constitutional:       General: She is not in acute distress.     Appearance: Normal appearance.   HENT:      Head: Normocephalic.      Right Ear: Tympanic membrane, ear canal and external ear normal.      Left Ear: Tympanic membrane, ear canal and external ear normal.      Nose: Nose normal.      Mouth/Throat:      Mouth: Mucous membranes are moist.      Pharynx: Oropharynx is clear.   Eyes:      Conjunctiva/sclera: Conjunctivae normal.      Pupils: Pupils are equal, round, and reactive to light.   Neck:      Thyroid: No thyroid mass or thyromegaly.      Vascular: Normal carotid pulses. No carotid bruit.   Cardiovascular:      Rate and Rhythm: Normal rate and regular rhythm.      Pulses: Normal pulses.      Heart sounds: Normal heart sounds.   Pulmonary:      Effort: Pulmonary effort is normal.      Breath sounds: Normal breath sounds.   Abdominal:      Palpations: Abdomen is soft.      Tenderness: There is no abdominal tenderness.   Musculoskeletal:      Cervical back: Neck supple.      Right lower leg: No edema.      Left lower leg: No edema.   Lymphadenopathy: "      Cervical: No cervical adenopathy.   Skin:     General: Skin is warm and dry.   Neurological:      General: No focal deficit present.      Mental Status: She is alert and oriented to person, place, and time.   Psychiatric:         Mood and Affect: Mood normal.         Behavior: Behavior normal.       Assessment and Plan     Problem List Items Addressed This Visit    None  Visit Diagnoses       Routine general medical examination at a health care facility    -  Primary    Screening for diabetes mellitus        Screening for lipoid disorders        Need for diphtheria-tetanus-pertussis (Tdap) vaccine        Relevant Orders    Tdap Vaccine (Completed)    Screening for malignant neoplasm of colon        Relevant Orders    Ambulatory referral/consult to Gastroenterology    Breast cancer screening by mammogram        Relevant Orders    Mammo Digital Screening Bilat    Depressive disorder        Relevant Medications    buPROPion (WELLBUTRIN XL) 300 MG 24 hr tablet            Plan:   Had 2 covid shots + 1 booster.  Declines HIV screening.  Refer to Dr. Solomon for screening colonoscopy.  Schedule screening mammogram.  TDAP update  Declines Shingrix.    Increase Wellbutrin XL to 300 mg daily for depression and anxiety.        I have reviewed the medications, allergies, and problem list.     Goal Actions:    What type of visit is the patient here for today?: Healthy You  Does the patient consent to enroll in [facility] Me Healthy?:  (n/a)  Is this a Wellness Follow Up?: No  What is your overall wellness goal? (select at least one): Lifestyle modifications  Choose 3: Lifestyle, Nutrition, Tobacco  Lifestyle Actions : Schedule colonoscopy, sigmoidoscopy, or Colorguard if applicable, Obtain yearly mammogram  Nurtrition Actions: drink 8-10 glasses of water per day  Tobacco Actions: Patient will not stop using tobacco recommend reducing amt of consumption per day (Pt does desire to quit but is not ready yet.  Is decreasing amount  she smokes.)    F/u 6 mths f/u depression/wellbutrin; and HY 1 yr with fasting labs; f/u as needed.    Annie RYANP

## 2023-05-26 ENCOUNTER — PATIENT OUTREACH (OUTPATIENT)
Dept: ADMINISTRATIVE | Facility: HOSPITAL | Age: 58
End: 2023-05-26

## 2023-05-26 NOTE — PROGRESS NOTES
Post visit Population Health review of encounter with date of service  5/25 with Calista.  All required HY components in encounter.    Added myself to sarah

## 2023-08-24 ENCOUNTER — OFFICE VISIT (OUTPATIENT)
Dept: FAMILY MEDICINE | Facility: CLINIC | Age: 58
End: 2023-08-24
Payer: COMMERCIAL

## 2023-08-24 VITALS
TEMPERATURE: 99 F | DIASTOLIC BLOOD PRESSURE: 63 MMHG | OXYGEN SATURATION: 98 % | BODY MASS INDEX: 28.63 KG/M2 | SYSTOLIC BLOOD PRESSURE: 131 MMHG | WEIGHT: 200 LBS | HEIGHT: 70 IN | HEART RATE: 73 BPM | RESPIRATION RATE: 20 BRPM

## 2023-08-24 DIAGNOSIS — M54.50 ACUTE BILATERAL LOW BACK PAIN WITHOUT SCIATICA: ICD-10-CM

## 2023-08-24 DIAGNOSIS — U07.1 COVID-19: Primary | ICD-10-CM

## 2023-08-24 LAB
BILIRUB SERPL-MCNC: NORMAL MG/DL
BLOOD URINE, POC: NORMAL
COLOR, POC UA: NORMAL
CTP QC/QA: YES
FLUAV AG NPH QL: NEGATIVE
FLUBV AG NPH QL: NEGATIVE
GLUCOSE UR QL STRIP: NORMAL
KETONES UR QL STRIP: NORMAL
LEUKOCYTE ESTERASE URINE, POC: NORMAL
NITRITE, POC UA: NORMAL
PH, POC UA: 6
PROTEIN, POC: NORMAL
SARS-COV-2 AG RESP QL IA.RAPID: POSITIVE
SPECIFIC GRAVITY, POC UA: 1.03
UROBILINOGEN, POC UA: 0.2

## 2023-08-24 PROCEDURE — 87428 POCT SARS-COV2 (COVID) WITH FLU ANTIGEN: ICD-10-PCS | Mod: QW,,, | Performed by: NURSE PRACTITIONER

## 2023-08-24 PROCEDURE — 1160F RVW MEDS BY RX/DR IN RCRD: CPT | Mod: ,,, | Performed by: NURSE PRACTITIONER

## 2023-08-24 PROCEDURE — 1159F MED LIST DOCD IN RCRD: CPT | Mod: ,,, | Performed by: NURSE PRACTITIONER

## 2023-08-24 PROCEDURE — 3008F BODY MASS INDEX DOCD: CPT | Mod: ,,, | Performed by: NURSE PRACTITIONER

## 2023-08-24 PROCEDURE — 3044F PR MOST RECENT HEMOGLOBIN A1C LEVEL <7.0%: ICD-10-PCS | Mod: ,,, | Performed by: NURSE PRACTITIONER

## 2023-08-24 PROCEDURE — 1159F PR MEDICATION LIST DOCUMENTED IN MEDICAL RECORD: ICD-10-PCS | Mod: ,,, | Performed by: NURSE PRACTITIONER

## 2023-08-24 PROCEDURE — 99213 PR OFFICE/OUTPT VISIT, EST, LEVL III, 20-29 MIN: ICD-10-PCS | Mod: ,,, | Performed by: NURSE PRACTITIONER

## 2023-08-24 PROCEDURE — 3044F HG A1C LEVEL LT 7.0%: CPT | Mod: ,,, | Performed by: NURSE PRACTITIONER

## 2023-08-24 PROCEDURE — 3008F PR BODY MASS INDEX (BMI) DOCUMENTED: ICD-10-PCS | Mod: ,,, | Performed by: NURSE PRACTITIONER

## 2023-08-24 PROCEDURE — 81003 URINALYSIS AUTO W/O SCOPE: CPT | Mod: QW,,, | Performed by: NURSE PRACTITIONER

## 2023-08-24 PROCEDURE — 3078F PR MOST RECENT DIASTOLIC BLOOD PRESSURE < 80 MM HG: ICD-10-PCS | Mod: ,,, | Performed by: NURSE PRACTITIONER

## 2023-08-24 PROCEDURE — 3075F SYST BP GE 130 - 139MM HG: CPT | Mod: ,,, | Performed by: NURSE PRACTITIONER

## 2023-08-24 PROCEDURE — 3075F PR MOST RECENT SYSTOLIC BLOOD PRESS GE 130-139MM HG: ICD-10-PCS | Mod: ,,, | Performed by: NURSE PRACTITIONER

## 2023-08-24 PROCEDURE — 81003 POCT URINALYSIS W/O SCOPE: ICD-10-PCS | Mod: QW,,, | Performed by: NURSE PRACTITIONER

## 2023-08-24 PROCEDURE — 87428 SARSCOV & INF VIR A&B AG IA: CPT | Mod: QW,,, | Performed by: NURSE PRACTITIONER

## 2023-08-24 PROCEDURE — 99213 OFFICE O/P EST LOW 20 MIN: CPT | Mod: ,,, | Performed by: NURSE PRACTITIONER

## 2023-08-24 PROCEDURE — 3078F DIAST BP <80 MM HG: CPT | Mod: ,,, | Performed by: NURSE PRACTITIONER

## 2023-08-24 PROCEDURE — 1160F PR REVIEW ALL MEDS BY PRESCRIBER/CLIN PHARMACIST DOCUMENTED: ICD-10-PCS | Mod: ,,, | Performed by: NURSE PRACTITIONER

## 2023-08-24 NOTE — PROGRESS NOTES
[facility] - FAMILY MEDICINE       PATIENT NAME: Imelda Anderson   : 1965    AGE: 57 y.o. DATE OF ENCOUNTER: 23    MRN: [id number]      PCP: Annie Grigsby FNP    Reason for Visit / Chief Complaint:  Urinary Tract Infection and URI (Patient presents to clinic with complaints of back pain. Denies pain when urinating. Complaints of Sinus pressure since Saturday.  Denies any fever, body aches, and chills/)         274}    Subjective:     HPI:    Presents for upper resp s/s with LBP x 3 days.    Home test neg Mon    Review of Systems:   Review of Systems   Constitutional:  Positive for chills and fatigue. Negative for fever.   HENT:  Positive for congestion, postnasal drip, rhinorrhea, sinus pressure and sore throat. Negative for ear pain.    Respiratory:  Positive for cough and shortness of breath. Negative for wheezing.    Cardiovascular:  Negative for chest pain.   Gastrointestinal:  Positive for diarrhea (only Monday). Negative for abdominal pain, nausea and vomiting.   Genitourinary:  Negative for difficulty urinating, dysuria, flank pain, frequency, hematuria and urgency.   Musculoskeletal:  Positive for back pain. Negative for myalgias.   Skin: Negative.    Neurological:  Negative for headaches.       Allergies and Meds: 274}   Review of patient's allergies indicates:  No Known Allergies     Current Outpatient Medications:     buPROPion (WELLBUTRIN XL) 300 MG 24 hr tablet, Take 1 tablet (300 mg total) by mouth once daily., Disp: 90 tablet, Rfl: 1    Labs:274}   I have reviewed labs below:  Lab Results   Component Value Date    WBC 5.69 04/10/2023    RBC 5.06 04/10/2023    HGB 14.4 04/10/2023    HCT 43.4 04/10/2023     04/10/2023     04/10/2023    K 4.1 04/10/2023     (H) 04/10/2023    CALCIUM 9.2 04/10/2023    GLU 90 04/10/2023    BUN 11 04/10/2023    CREATININE 0.66 04/10/2023    ESTGFRAFRICA 94 12/15/2020    EGFRNONAA 77 12/15/2020    ALT 40 04/10/2023    AST  "34 04/10/2023    CHOL 191 05/25/2023    TRIG 142 05/25/2023    HDL 42 05/25/2023    LDLCALC 121 05/25/2023    TSH 2.010 04/10/2023    HGBA1C 5.8 05/25/2023       Medical History: 274}     Past Medical History:   Diagnosis Date    Anxiety       Social History     Tobacco Use   Smoking Status Every Day    Types: Cigarettes    Passive exposure: Current   Smokeless Tobacco Current   Tobacco Comments    3-5 cigs per day.       Past Surgical History:   Procedure Laterality Date    HYSTERECTOMY      TUBAL LIGATION  2010        Health Maintenance: 274}     Health Maintenance         Date Due Completion Date    COVID-19 Vaccine (1) Never done ---    Colorectal Cancer Screening Never done ---    Shingles Vaccine (1 of 2) Never done ---    Mammogram 08/17/2022 8/17/2021    HIV Screening 05/25/2029 (Originally 9/28/1978) ---    Influenza Vaccine (1) 09/01/2023 4/10/2023    Hemoglobin A1c (Diabetic Prevention Screening) 05/25/2026 5/25/2023    Lipid Panel 05/25/2028 5/25/2023    TETANUS VACCINE 05/25/2033 5/25/2023            Objective:  274}   /63 (BP Location: Left arm, Patient Position: Sitting, BP Method: Large (Manual))   Pulse 73   Temp 98.8 °F (37.1 °C) (Oral)   Resp 20   Ht 5' 10" (1.778 m)   Wt 90.7 kg (200 lb)   SpO2 98%   BMI 28.70 kg/m²     Wt Readings from Last 3 Encounters:   08/24/23 90.7 kg (200 lb)   05/25/23 91.4 kg (201 lb 6.4 oz)   04/10/23 92.1 kg (203 lb)     BP Readings from Last 3 Encounters:   08/24/23 131/63   05/25/23 115/69   04/10/23 125/81     Body mass index is 28.7 kg/m².     Physical Exam  Vitals and nursing note reviewed.   Constitutional:       General: She is not in acute distress.     Appearance: Normal appearance. She is not ill-appearing.   HENT:      Head: Normocephalic.      Right Ear: Tympanic membrane, ear canal and external ear normal.      Left Ear: Tympanic membrane, ear canal and external ear normal.      Nose: Congestion and rhinorrhea present. Rhinorrhea is clear.     "  Mouth/Throat:      Pharynx: Oropharynx is clear.   Eyes:      Conjunctiva/sclera: Conjunctivae normal.   Neck:      Thyroid: No thyromegaly.      Trachea: Trachea normal.   Cardiovascular:      Rate and Rhythm: Normal rate and regular rhythm.      Pulses: Normal pulses.      Heart sounds: Normal heart sounds.   Pulmonary:      Effort: Pulmonary effort is normal. No respiratory distress.      Breath sounds: Normal breath sounds.   Abdominal:      Palpations: Abdomen is soft.      Tenderness: There is no right CVA tenderness, left CVA tenderness or guarding.   Musculoskeletal:      Cervical back: Neck supple. No rigidity.      Right lower leg: No edema.      Left lower leg: No edema.   Lymphadenopathy:      Cervical: No cervical adenopathy.   Skin:     General: Skin is warm and dry.   Neurological:      General: No focal deficit present.      Mental Status: She is alert and oriented to person, place, and time.   Psychiatric:         Mood and Affect: Mood normal.         Behavior: Behavior normal.          Assessment and Plan: 274}     1. COVID-19  -     POCT SARS-COV2 (COVID) with Flu Antigen    2. Acute bilateral low back pain without sciatica  -     POCT URINALYSIS W/O SCOPE       Office Visit on 08/24/2023   Component Date Value Ref Range Status    Spec Grav UA 08/24/2023 1.030   Final    pH, UA 08/24/2023 6.0   Final    WBC, UA 08/24/2023 neg   Final    Nitrite, UA 08/24/2023 neg   Final    Protein, POC 08/24/2023 neg   Final    Glucose, UA 08/24/2023 neg   Final    Ketones, UA 08/24/2023 neg   Final    Bilirubin, POC 08/24/2023 neg   Final    Urobilinogen, UA 08/24/2023 0.2   Final    Blood, UA 08/24/2023 neg   Final    SARS Coronavirus 2 Antigen 08/24/2023 Positive (A)  Negative Final    Rapid Influenza A Ag 08/24/2023 Negative  Negative Final    Rapid Influenza B Ag 08/24/2023 Negative  Negative Final     Acceptable 08/24/2023 Yes   Final        UA dip neg.  Advised to quarantine per updated CDC  guidelines.  Advised COVID-19 is a virus and viruses do not respond to antibiotics nor do antibiotics prevent a bacterial infection from developing.   Advised steroids are not recommended for outpatient management of covid symptoms.   Advised of EAU anti-viral within first 5 days of illness if not contraindicated by medications, liver or renal function, or other medical condition.  Declines Paxlovid.   Treatment for COVID-19 is primarily supportive including rest, increase fluids, and the use of OTC meds to help alleviate symptoms.    For immune support can take vitamin C and zinc 15 mg daily.  Acetaminophen or ibuprofen as needed for body aches, headache, or fever.  Avoid NSAIDs if history of renal disease.  Can use home O2 sat monitoring if experiencing shortness of breath.   Report to ED for evaluation of any new or worsening chest pain, shortness of breath, or other severe symptoms.  Call the office if you have other questions or concerns that arise.    Return to clinic as scheduled; and sooner as needed.    Future Appointments   Date Time Provider Department Center   [facility]2023 [facility]   11/27/2023 10:40 AM Annie Grigsby FNP [facility] SALLY Cruz   5/29/2024  7:20 AM Annie Grigsby FNP [facility]        Signature:  AMY Victoria    Reviewed on 11/21/2023 by Jed Stevenson MD.

## 2023-08-24 NOTE — LETTER
August 24, 2023    Imelda Anderson  [address] [place] [address]             [facility] - Family Medicine  Family Medicine  [address] [place] [address] [address] [place] [address]  Phone: [phone]  Fax: [phone]   August 24, 2023     Patient: Imelda Anderson   YOB: 1965   Date of Visit: 8/24/2023       To Whom it May Concern:    Imelda Anderson was seen in my clinic on 8/24/2023. She may return to work on 08/28/2023 .    Please excuse her from any classes or work missed.    If you have any questions or concerns, please don't hesitate to call.    Sincerely,         Annie Grigsby, CONNORP

## 2023-10-09 ENCOUNTER — HOSPITAL ENCOUNTER (OUTPATIENT)
Dept: RADIOLOGY | Facility: HOSPITAL | Age: 58
Discharge: HOME | End: 2023-10-09
Attending: NURSE PRACTITIONER
Payer: COMMERCIAL

## 2023-10-09 VITALS — WEIGHT: 200 LBS | HEIGHT: 70 IN | BODY MASS INDEX: 28.63 KG/M2

## 2023-10-09 DIAGNOSIS — Z12.31 BREAST CANCER SCREENING BY MAMMOGRAM: ICD-10-CM

## 2023-10-09 PROCEDURE — 77067 SCR MAMMO BI INCL CAD: CPT | Mod: TC

## 2024-05-26 ENCOUNTER — OFFICE VISIT (OUTPATIENT)
Dept: FAMILY MEDICINE | Facility: CLINIC | Age: 59
End: 2024-05-26
Payer: COMMERCIAL

## 2024-05-26 VITALS
HEART RATE: 75 BPM | OXYGEN SATURATION: 99 % | BODY MASS INDEX: 29.2 KG/M2 | WEIGHT: 204 LBS | HEIGHT: 70 IN | TEMPERATURE: 98 F | SYSTOLIC BLOOD PRESSURE: 130 MMHG | RESPIRATION RATE: 20 BRPM | DIASTOLIC BLOOD PRESSURE: 81 MMHG

## 2024-05-26 DIAGNOSIS — J01.00 ACUTE NON-RECURRENT MAXILLARY SINUSITIS: ICD-10-CM

## 2024-05-26 DIAGNOSIS — J02.9 SORE THROAT: Primary | ICD-10-CM

## 2024-05-26 LAB
CTP QC/QA: YES
MOLECULAR STREP A: NEGATIVE

## 2024-05-26 PROCEDURE — 99051 MED SERV EVE/WKEND/HOLIDAY: CPT | Mod: ,,, | Performed by: NURSE PRACTITIONER

## 2024-05-26 PROCEDURE — 3079F DIAST BP 80-89 MM HG: CPT | Mod: ,,, | Performed by: NURSE PRACTITIONER

## 2024-05-26 PROCEDURE — 87651 STREP A DNA AMP PROBE: CPT | Mod: QW,,, | Performed by: NURSE PRACTITIONER

## 2024-05-26 PROCEDURE — 3008F BODY MASS INDEX DOCD: CPT | Mod: ,,, | Performed by: NURSE PRACTITIONER

## 2024-05-26 PROCEDURE — 99212 OFFICE O/P EST SF 10 MIN: CPT | Mod: ,,, | Performed by: NURSE PRACTITIONER

## 2024-05-26 PROCEDURE — 3075F SYST BP GE 130 - 139MM HG: CPT | Mod: ,,, | Performed by: NURSE PRACTITIONER

## 2024-05-26 RX ORDER — CEFDINIR 300 MG/1
300 CAPSULE ORAL 2 TIMES DAILY
Qty: 20 CAPSULE | Refills: 0 | Status: SHIPPED | OUTPATIENT
Start: 2024-05-26 | End: 2024-05-29 | Stop reason: ALTCHOICE

## 2024-05-29 ENCOUNTER — OFFICE VISIT (OUTPATIENT)
Dept: FAMILY MEDICINE | Facility: CLINIC | Age: 59
End: 2024-05-29
Payer: COMMERCIAL

## 2024-05-29 VITALS
HEIGHT: 70 IN | WEIGHT: 196 LBS | OXYGEN SATURATION: 99 % | DIASTOLIC BLOOD PRESSURE: 84 MMHG | BODY MASS INDEX: 28.06 KG/M2 | SYSTOLIC BLOOD PRESSURE: 135 MMHG | TEMPERATURE: 98 F | HEART RATE: 77 BPM | RESPIRATION RATE: 20 BRPM

## 2024-05-29 DIAGNOSIS — Z11.4 SCREENING FOR HIV (HUMAN IMMUNODEFICIENCY VIRUS): ICD-10-CM

## 2024-05-29 DIAGNOSIS — Z13.1 SCREENING FOR DIABETES MELLITUS: ICD-10-CM

## 2024-05-29 DIAGNOSIS — Z13.220 SCREENING FOR LIPOID DISORDERS: ICD-10-CM

## 2024-05-29 DIAGNOSIS — F33.1 MAJOR DEPRESSIVE DISORDER, RECURRENT, MODERATE: ICD-10-CM

## 2024-05-29 DIAGNOSIS — E55.9 VITAMIN D DEFICIENCY: Chronic | ICD-10-CM

## 2024-05-29 DIAGNOSIS — Z00.00 ROUTINE GENERAL MEDICAL EXAMINATION AT A HEALTH CARE FACILITY: Primary | ICD-10-CM

## 2024-05-29 DIAGNOSIS — Z23 NEED FOR SHINGLES VACCINE: ICD-10-CM

## 2024-05-29 DIAGNOSIS — F17.210 NICOTINE DEPENDENCE, CIGARETTES, UNCOMPLICATED: Chronic | ICD-10-CM

## 2024-05-29 DIAGNOSIS — Z12.11 SCREENING FOR MALIGNANT NEOPLASM OF COLON: ICD-10-CM

## 2024-05-29 PROCEDURE — 82947 ASSAY GLUCOSE BLOOD QUANT: CPT | Mod: ,,, | Performed by: CLINICAL MEDICAL LABORATORY

## 2024-05-29 PROCEDURE — 3079F DIAST BP 80-89 MM HG: CPT | Mod: ,,, | Performed by: NURSE PRACTITIONER

## 2024-05-29 PROCEDURE — 99396 PREV VISIT EST AGE 40-64: CPT | Mod: ,,, | Performed by: NURSE PRACTITIONER

## 2024-05-29 PROCEDURE — 1159F MED LIST DOCD IN RCRD: CPT | Mod: ,,, | Performed by: NURSE PRACTITIONER

## 2024-05-29 PROCEDURE — 87389 HIV-1 AG W/HIV-1&-2 AB AG IA: CPT | Mod: ,,, | Performed by: CLINICAL MEDICAL LABORATORY

## 2024-05-29 PROCEDURE — 80061 LIPID PANEL: CPT | Mod: ,,, | Performed by: CLINICAL MEDICAL LABORATORY

## 2024-05-29 PROCEDURE — 3075F SYST BP GE 130 - 139MM HG: CPT | Mod: ,,, | Performed by: NURSE PRACTITIONER

## 2024-05-29 PROCEDURE — 82306 VITAMIN D 25 HYDROXY: CPT | Mod: ,,, | Performed by: CLINICAL MEDICAL LABORATORY

## 2024-05-29 PROCEDURE — 3008F BODY MASS INDEX DOCD: CPT | Mod: ,,, | Performed by: NURSE PRACTITIONER

## 2024-05-29 PROCEDURE — 1160F RVW MEDS BY RX/DR IN RCRD: CPT | Mod: ,,, | Performed by: NURSE PRACTITIONER

## 2024-05-29 RX ORDER — POLYETHYLENE GLYCOL 3350, SODIUM SULFATE ANHYDROUS, SODIUM BICARBONATE, SODIUM CHLORIDE, POTASSIUM CHLORIDE 236; 22.74; 6.74; 5.86; 2.97 G/4L; G/4L; G/4L; G/4L; G/4L
4 POWDER, FOR SOLUTION ORAL ONCE
Qty: 4000 ML | Refills: 0 | Status: CANCELLED | OUTPATIENT
Start: 2024-05-29 | End: 2024-05-29

## 2024-05-29 RX ORDER — AMOXICILLIN 500 MG/1
500 CAPSULE ORAL 3 TIMES DAILY
COMMUNITY
Start: 2024-05-27

## 2024-05-29 RX ORDER — BUPROPION HYDROCHLORIDE 300 MG/1
300 TABLET ORAL DAILY
Qty: 90 TABLET | Refills: 1 | Status: SHIPPED | OUTPATIENT
Start: 2024-05-29 | End: 2024-11-25

## 2024-05-29 NOTE — ASSESSMENT & PLAN NOTE
Not controlled, not taking wellbutrin XL consistently but it helps.  Refill wellbutrin  mg and take daily w/o fail.  F/u 6 mths for depression or sooner if not improving.

## 2024-05-29 NOTE — PROGRESS NOTES
[facility] - FAMILY MEDICINE       PATIENT NAME: Imelda Anderson   : 1965    AGE: 58 y.o. DATE OF ENCOUNTER: 24    MRN: [id number]      Subjective     Patient ID: Imelda Anderson is a 58 y.o. female.    Chief Complaint: Healthy You (Pt presents to the clinic for a healthy you ) and Medication Refill (Wellbutrin xl)    HPI  Presents for healthy you wellness visit.   Currently on amoxicillin from urgent care for URI/sore throat and s/s are much better.  Was referred May 2023 for screening colonoscopy and was a no show 23, never rescheduled but needs to.  Hasn't been taking wellbutrin XL regularly but it helps and needs refills.    Review of Systems   Constitutional: Negative.    HENT: Negative.     Respiratory: Negative.     Cardiovascular: Negative.    Gastrointestinal: Negative.    Musculoskeletal: Negative.    Integumentary:  Negative.   Neurological: Negative.    Psychiatric/Behavioral:  Positive for dysphoric mood. Negative for self-injury and suicidal ideas.        Tobacco Use: High Risk (2024)    Patient History     Smoking Tobacco Use: Some Days     Smokeless Tobacco Use: Current     Passive Exposure: Current     Review of patient's allergies indicates:  No Known Allergies  Current Outpatient Medications   Medication Instructions    amoxicillin (AMOXIL) 500 mg, Oral, 3 times daily    buPROPion (WELLBUTRIN XL) 300 mg, Oral, Daily     Medications Discontinued During This Encounter   Medication Reason    cefdinir (OMNICEF) 300 MG capsule Alternate therapy    buPROPion (WELLBUTRIN XL) 300 MG 24 hr tablet Reorder       Past Medical History:   Diagnosis Date    Anxiety      Health Maintenance Topics with due status: Not Due       Topic Last Completion Date    Influenza Vaccine 04/10/2023    TETANUS VACCINE 2023    Hemoglobin A1c (Diabetic Prevention Screening) 2023    Lipid Panel 2023    Mammogram 10/09/2023     Immunization History   Administered Date(s)  "Administered    Influenza - Quadrivalent - PF *Preferred* (6 months and older) 04/10/2023    Pneumococcal Conjugate - 20 Valent 04/10/2023    Tdap 05/25/2023       Objective     Body mass index is 28.12 kg/m².  Wt Readings from Last 3 Encounters:   05/29/24 88.9 kg (196 lb)   05/26/24 92.5 kg (204 lb)   10/09/23 90.7 kg (200 lb)     Ht Readings from Last 3 Encounters:   05/29/24 5' 10" (1.778 m)   05/26/24 5' 10" (1.778 m)   10/09/23 5' 10" (1.778 m)     BP Readings from Last 3 Encounters:   05/29/24 135/84   05/26/24 130/81   08/24/23 131/63     Temp Readings from Last 3 Encounters:   05/29/24 97.9 °F (36.6 °C) (Oral)   05/26/24 98.4 °F (36.9 °C) (Oral)   08/24/23 98.8 °F (37.1 °C) (Oral)     Pulse Readings from Last 3 Encounters:   05/29/24 77   05/26/24 75   08/24/23 73     Resp Readings from Last 3 Encounters:   05/29/24 20   05/26/24 20   08/24/23 20     PF Readings from Last 3 Encounters:   No data found for PF       Physical Exam  Vitals and nursing note reviewed.   Constitutional:       General: She is not in acute distress.     Appearance: Normal appearance. She is not ill-appearing.   HENT:      Head: Normocephalic.      Right Ear: Tympanic membrane, ear canal and external ear normal.      Left Ear: Tympanic membrane, ear canal and external ear normal.      Nose: Nose normal.      Mouth/Throat:      Mouth: Mucous membranes are moist.      Pharynx: Oropharynx is clear. Uvula midline. No posterior oropharyngeal erythema or uvula swelling.   Eyes:      Conjunctiva/sclera: Conjunctivae normal.      Pupils: Pupils are equal, round, and reactive to light.   Neck:      Thyroid: No thyroid mass or thyromegaly.      Vascular: Normal carotid pulses. No carotid bruit.   Cardiovascular:      Rate and Rhythm: Normal rate and regular rhythm.      Pulses: Normal pulses.      Heart sounds: Normal heart sounds.   Pulmonary:      Effort: Pulmonary effort is normal. No respiratory distress.      Breath sounds: Normal breath " sounds. No wheezing, rhonchi or rales.   Abdominal:      Palpations: Abdomen is soft.      Tenderness: There is no abdominal tenderness.   Musculoskeletal:      Cervical back: Neck supple.      Right lower leg: No edema.      Left lower leg: No edema.   Lymphadenopathy:      Cervical: No cervical adenopathy.   Skin:     General: Skin is warm and dry.      Capillary Refill: Capillary refill takes less than 2 seconds.      Coloration: Skin is not jaundiced or pale.   Neurological:      General: No focal deficit present.      Mental Status: She is alert and oriented to person, place, and time.      Gait: Gait normal.   Psychiatric:         Mood and Affect: Mood normal.         Behavior: Behavior normal.         Assessment and Plan     1. Routine general medical examination at a health care facility    2. Screening for diabetes mellitus  -     Glucose, Fasting; Future; Expected date: 05/29/2024    3. Screening for lipoid disorders  -     Lipid Panel; Future; Expected date: 05/29/2024    4. Major depressive disorder, recurrent, moderate  Assessment & Plan:  Not controlled, not taking wellbutrin XL consistently but it helps.  Refill wellbutrin  mg and take daily w/o fail.  F/u 6 mths for depression or sooner if not improving.    Orders:  -     buPROPion (WELLBUTRIN XL) 300 MG 24 hr tablet; Take 1 tablet (300 mg total) by mouth once daily.  Dispense: 90 tablet; Refill: 1    5. Nicotine dependence, cigarettes, uncomplicated  Assessment & Plan:  Strongly advised smoking cessation.      6. Vitamin D deficiency  -     Vitamin D; Future; Expected date: 05/29/2024    7. Screening for malignant neoplasm of colon  Assessment & Plan:  Refer to Dr. Bosch for screening colonoscopy.    Orders:  -     Colonoscopy; Future; Expected date: 05/29/2024    8. Screening for HIV (human immunodeficiency virus)  -     HIV 1/2 Ag/Ab (4th Gen); Future; Expected date: 05/29/2024    9. Need for shingles vaccine  Assessment & Plan:  Wants  to wait until later.         Plan:   Screening labs plus any other labs needed for long term monitoring.  Additional treatment plan as above.  Wants to wait to get Shingrix.        I have reviewed the medications, allergies, and problem list.     Goal Actions:    What type of visit is the patient here for today?: Healthy You  Does the patient consent to enroll in Color Me Healthy?:  (no [facility] benefits)  Is this a Wellness Follow Up?: No  What is your overall wellness goal? (select at least one): Lifestyle modifications  Choose 3: Biometric, Lifestyle, Nutrition, Exercise  Biometric Actions: SBP<120 and DBP<80  Lifestyle Actions : Take medications as prescribed  Nurtrition Actions: DASH diet  Exercise Actions: Recommend physical activity 30 minutes per day 3-5 times/week      Follow up in about 6 months (around 11/29/2024) for depression.  1 yr for next HY as scheduled    Signature:  AMY Victoria-BC    Future Appointments   Date Time Provider Department Center   10/14/2024  9:45 AM RUSH KENA MAMMO1 [facility] Lisa   11/21/2024  7:20 AM Annie Grigsby FNP [facility] Cruz   6/5/2025  7:20 AM Annie Grigsby FNP [facility]

## 2024-05-30 PROBLEM — J01.00 ACUTE NON-RECURRENT MAXILLARY SINUSITIS: Status: ACTIVE | Noted: 2024-05-30

## 2024-05-30 PROBLEM — J02.9 SORE THROAT: Status: ACTIVE | Noted: 2024-05-30

## 2024-05-30 LAB
25(OH)D3 SERPL-MCNC: 15.4 NG/ML
CHOLEST SERPL-MCNC: 191 MG/DL (ref 0–200)
CHOLEST/HDLC SERPL: 5.3 {RATIO}
GLUCOSE SERPL-MCNC: 89 MG/DL (ref 74–106)
HDLC SERPL-MCNC: 36 MG/DL (ref 40–60)
HIV 1+O+2 AB SERPL QL: NORMAL
LDLC SERPL CALC-MCNC: 125 MG/DL
LDLC/HDLC SERPL: 3.5 {RATIO}
NONHDLC SERPL-MCNC: 155 MG/DL
TRIGL SERPL-MCNC: 148 MG/DL (ref 35–150)
VLDLC SERPL-MCNC: 30 MG/DL

## 2024-05-30 NOTE — PROGRESS NOTES
[facility]  Chief Complaint      Chief Complaint   Patient presents with    Sore Throat     Pt presents to the clinic for sore throat has been hurting since Monday feel like a ball in the back of throat when she cough or sneeze its dark brown phlegm        History of Present Illness      Imelda Anderson is a 58 y.o. female who presents to clinic for evaluation of upper respiratory symptoms. She reports sore throat, cough, and sneezing. Onset of symptoms 7 days ago. She has taken OTC medications without improvement. Reports productive cough with dark brown phlegm, she denies malodor. Denies fever.     Sore Throat   Associated symptoms include coughing. Pertinent negatives include no abdominal pain, headaches or shortness of breath.      Past Medical History:  Past Medical History:   Diagnosis Date    Anxiety        Past Surgical History:   has a past surgical history that includes Hysterectomy and Tubal ligation (2010).    Social History:  Social History     Tobacco Use    Smoking status: Some Days     Current packs/day: 0.50     Average packs/day: 0.5 packs/day for 42.2 years (21.1 ttl pk-yrs)     Types: Cigarettes     Start date: 3/11/1982     Passive exposure: Current    Smokeless tobacco: Current    Tobacco comments:     3-5 cigs per day.    Substance Use Topics    Alcohol use: Never    Drug use: Never       I personally reviewed all past medical, surgical, and social.     Review of Systems   Constitutional:  Negative for appetite change, fatigue, fever and unexpected weight change.   HENT:  Positive for sneezing and sore throat.    Respiratory:  Positive for cough. Negative for shortness of breath.    Cardiovascular:  Negative for chest pain and leg swelling.   Gastrointestinal:  Negative for abdominal pain and nausea.   Genitourinary: Negative.    Musculoskeletal:  Negative for arthralgias and myalgias.   Skin:  Negative for color change and rash.   Neurological:  Negative for dizziness,  "syncope, weakness and headaches.   Psychiatric/Behavioral:  Negative for dysphoric mood.       Medications:  Outpatient Encounter Medications as of 5/26/2024   Medication Sig Note Dispense Refill    [DISCONTINUED] buPROPion (WELLBUTRIN XL) 300 MG 24 hr tablet Take 1 tablet (300 mg total) by mouth once daily. 5/29/2024: refill 90 tablet 1    [DISCONTINUED] cefdinir (OMNICEF) 300 MG capsule Take 1 capsule (300 mg total) by mouth 2 (two) times daily. for 10 days (Patient not taking: Reported on 5/29/2024) 5/29/2024: is taking amoxicillin 20 capsule 0     No facility-administered encounter medications on file as of 5/26/2024.       Allergies:  Review of patient's allergies indicates:  No Known Allergies    Health Maintenance:  Immunization History   Administered Date(s) Administered    Influenza - Quadrivalent - PF *Preferred* (6 months and older) 04/10/2023    Pneumococcal Conjugate - 20 Valent 04/10/2023    Tdap 05/25/2023        Health Maintenance   Topic Date Due    Colorectal Cancer Screening  Never done    LDCT Lung Screen  Never done    Shingles Vaccine (1 of 2) Never done    Mammogram  10/09/2024    Lipid Panel  05/29/2029    TETANUS VACCINE  05/25/2033    Hepatitis C Screening  Completed        Physical Exam      Vital Signs  Temp: 98.4 °F (36.9 °C)  Temp Source: Oral  Pulse: 75  Resp: 20  SpO2: 99 %  BP: 130/81  BP Location: Right arm  Patient Position: Sitting  Pain Score:   8  Pain Loc: Throat  Height and Weight  Height: 5' 10" (177.8 cm)  Weight: 92.5 kg (204 lb)  BSA (Calculated - sq m): 2.14 sq meters  BMI (Calculated): 29.3  Weight in (lb) to have BMI = 25: 173.9]    Physical Exam     Laboratory:  CBC:  Recent Labs   Lab 04/10/23  0943   WBC 5.69   RBC 5.06   Hemoglobin 14.4   Hematocrit 43.4   Platelet Count 261   MCV 85.8   MCH 28.5   MCHC 33.2       LIPIDS:  Recent Labs   Lab 04/10/23  0943 05/25/23  0856 05/29/24  0746   TSH 2.010  --   --    HDL Cholesterol  --  42 36 L   Cholesterol  --  191 191 " "  Triglycerides  --  142 148   LDL Calculated  --  121 125   Cholesterol/HDL Ratio (Risk Factor)  --  4.5 5.3   Non-HDL  --  149 155       TSH:  Recent Labs   Lab 04/10/23  0943   TSH 2.010       A1C:  Recent Labs   Lab 05/25/23  0856   Hemoglobin A1C 5.8       Lab Results   Component Value Date    GLU 90 04/10/2023     04/10/2023    K 4.1 04/10/2023     (H) 04/10/2023    CO2 28 04/10/2023    BUN 11 04/10/2023    CREATININE 0.66 04/10/2023    CALCIUM 9.2 04/10/2023    PROT 6.3 (L) 04/10/2023    ALBUMIN 3.8 04/10/2023    BILITOT 0.3 04/10/2023    ALKPHOS 115 04/10/2023    AST 34 04/10/2023    ALT 40 04/10/2023    ANIONGAP 9 04/10/2023    ESTGFRAFRICA 94 12/15/2020    EGFRNONAA 77 12/15/2020       Lab Results   Component Value Date    WBC 5.69 04/10/2023    RBC 5.06 04/10/2023    HGB 14.4 04/10/2023    HCT 43.4 04/10/2023    MCV 85.8 04/10/2023    RDW 12.4 04/10/2023     04/10/2023        Lab Results   Component Value Date    CHOL 191 05/29/2024    TRIG 148 05/29/2024    HDL 36 (L) 05/29/2024    LDLCALC 125 05/29/2024       Lab Results   Component Value Date    TSH 2.010 04/10/2023       Lab Results   Component Value Date    HGBA1C 5.8 05/25/2023    ESTIMATEDAVG 107 05/25/2023        No components found for: "VITAMIND"    No results found for: "PSA"    Point Of Care Testing:  WBC, UA   Date Value Ref Range Status   08/24/2023 neg  Final     Nitrite, UA   Date Value Ref Range Status   08/24/2023 neg  Final     Urobilinogen, UA   Date Value Ref Range Status   08/24/2023 0.2  Final     Protein, POC   Date Value Ref Range Status   08/24/2023 neg  Final     pH, UA   Date Value Ref Range Status   08/24/2023 6.0  Final     Spec Grav UA   Date Value Ref Range Status   08/24/2023 1.030  Final     Ketones, UA   Date Value Ref Range Status   08/24/2023 neg  Final     Bilirubin, POC   Date Value Ref Range Status   08/24/2023 neg  Final     Glucose, UA   Date Value Ref Range Status   08/24/2023 neg  Final "       Lab Results   Component Value Date    NBFXLCY0FX Positive (A) 08/24/2023    RAPFLUA Negative 08/24/2023    RAPFLUB Negative 08/24/2023         Assessment/Plan     1. Sore throat  -     POCT Strep A, Molecular    2. Acute non-recurrent maxillary sinusitis  -     Cefdinir 300 mg po BID for 10 days. Dispense: 20 with no refills.      Follow -up with PCP if symptoms worsen or fail to improve      Questions answered to desired level of satisfaction    Verbalized understanding to all information and instructions provided      RUPALI Zarate-[facility]

## 2024-06-02 DIAGNOSIS — E55.9 VITAMIN D DEFICIENCY: Primary | Chronic | ICD-10-CM

## 2024-06-02 RX ORDER — ASPIRIN 325 MG
50000 TABLET, DELAYED RELEASE (ENTERIC COATED) ORAL WEEKLY
Qty: 12 CAPSULE | Refills: 0 | Status: SHIPPED | OUTPATIENT
Start: 2024-06-02

## 2024-06-03 DIAGNOSIS — E78.49 OTHER HYPERLIPIDEMIA: Primary | Chronic | ICD-10-CM

## 2024-06-03 RX ORDER — ROSUVASTATIN CALCIUM 10 MG/1
10 TABLET, COATED ORAL DAILY
Qty: 90 TABLET | Refills: 3 | Status: SHIPPED | OUTPATIENT
Start: 2024-06-03 | End: 2025-06-03

## 2024-06-03 NOTE — PROGRESS NOTES
Message to Parris to notify pt new rx sent and she needs f/u visit for HLD, start of new med in 3 mths with fasting labs.

## 2024-09-02 PROBLEM — J01.00 ACUTE NON-RECURRENT MAXILLARY SINUSITIS: Status: RESOLVED | Noted: 2024-05-30 | Resolved: 2024-09-02

## 2024-09-03 ENCOUNTER — OFFICE VISIT (OUTPATIENT)
Dept: FAMILY MEDICINE | Facility: CLINIC | Age: 59
End: 2024-09-03
Payer: COMMERCIAL

## 2024-09-03 VITALS
BODY MASS INDEX: 28.97 KG/M2 | DIASTOLIC BLOOD PRESSURE: 80 MMHG | WEIGHT: 202.38 LBS | SYSTOLIC BLOOD PRESSURE: 132 MMHG | HEART RATE: 73 BPM | OXYGEN SATURATION: 100 % | RESPIRATION RATE: 18 BRPM | TEMPERATURE: 98 F | HEIGHT: 70 IN

## 2024-09-03 DIAGNOSIS — R21 SKIN ERUPTION: ICD-10-CM

## 2024-09-03 DIAGNOSIS — F17.210 NICOTINE DEPENDENCE, CIGARETTES, UNCOMPLICATED: Chronic | ICD-10-CM

## 2024-09-03 DIAGNOSIS — R73.03 PREDIABETES: ICD-10-CM

## 2024-09-03 DIAGNOSIS — Z79.899 ENCOUNTER FOR LONG-TERM (CURRENT) USE OF OTHER MEDICATIONS: ICD-10-CM

## 2024-09-03 DIAGNOSIS — D22.9 CHANGE IN COLOR OF SKIN MOLE: ICD-10-CM

## 2024-09-03 DIAGNOSIS — E78.49 OTHER HYPERLIPIDEMIA: Primary | Chronic | ICD-10-CM

## 2024-09-03 DIAGNOSIS — Z12.2 ENCOUNTER FOR SCREENING FOR LUNG CANCER: ICD-10-CM

## 2024-09-03 LAB
ALBUMIN SERPL BCP-MCNC: 3.6 G/DL (ref 3.5–5)
ALBUMIN/GLOB SERPL: 1.2 {RATIO}
ALP SERPL-CCNC: 132 U/L (ref 50–130)
ALT SERPL W P-5'-P-CCNC: 36 U/L (ref 13–56)
ANION GAP SERPL CALCULATED.3IONS-SCNC: 9 MMOL/L (ref 7–16)
AST SERPL W P-5'-P-CCNC: 25 U/L (ref 15–37)
BASOPHILS # BLD AUTO: 0.03 K/UL (ref 0–0.2)
BASOPHILS NFR BLD AUTO: 0.5 % (ref 0–1)
BILIRUB SERPL-MCNC: 0.2 MG/DL (ref ?–1.2)
BUN SERPL-MCNC: 10 MG/DL (ref 7–18)
BUN/CREAT SERPL: 12 (ref 6–20)
CALCIUM SERPL-MCNC: 8.9 MG/DL (ref 8.5–10.1)
CHLORIDE SERPL-SCNC: 115 MMOL/L (ref 98–107)
CHOLEST SERPL-MCNC: 199 MG/DL (ref 0–200)
CHOLEST/HDLC SERPL: 5.2 {RATIO}
CO2 SERPL-SCNC: 26 MMOL/L (ref 21–32)
CREAT SERPL-MCNC: 0.84 MG/DL (ref 0.55–1.02)
DIFFERENTIAL METHOD BLD: ABNORMAL
EGFR (NO RACE VARIABLE) (RUSH/TITUS): 80 ML/MIN/1.73M2
EOSINOPHIL # BLD AUTO: 0.13 K/UL (ref 0–0.5)
EOSINOPHIL NFR BLD AUTO: 2 % (ref 1–4)
ERYTHROCYTE [DISTWIDTH] IN BLOOD BY AUTOMATED COUNT: 12.9 % (ref 11.5–14.5)
EST. AVERAGE GLUCOSE BLD GHB EST-MCNC: 111 MG/DL
GLOBULIN SER-MCNC: 2.9 G/DL (ref 2–4)
GLUCOSE SERPL-MCNC: 61 MG/DL (ref 74–106)
HBA1C MFR BLD HPLC: 5.5 % (ref 4.5–6.6)
HCT VFR BLD AUTO: 41.7 % (ref 38–47)
HDLC SERPL-MCNC: 38 MG/DL (ref 40–60)
HGB BLD-MCNC: 13.7 G/DL (ref 12–16)
IMM GRANULOCYTES # BLD AUTO: 0.02 K/UL (ref 0–0.04)
IMM GRANULOCYTES NFR BLD: 0.3 % (ref 0–0.4)
LDLC SERPL CALC-MCNC: 106 MG/DL
LDLC/HDLC SERPL: 2.8 {RATIO}
LYMPHOCYTES # BLD AUTO: 2.72 K/UL (ref 1–4.8)
LYMPHOCYTES NFR BLD AUTO: 41.1 % (ref 27–41)
MCH RBC QN AUTO: 28.7 PG (ref 27–31)
MCHC RBC AUTO-ENTMCNC: 32.9 G/DL (ref 32–36)
MCV RBC AUTO: 87.4 FL (ref 80–96)
MONOCYTES # BLD AUTO: 0.49 K/UL (ref 0–0.8)
MONOCYTES NFR BLD AUTO: 7.4 % (ref 2–6)
MPC BLD CALC-MCNC: 11 FL (ref 9.4–12.4)
NEUTROPHILS # BLD AUTO: 3.23 K/UL (ref 1.8–7.7)
NEUTROPHILS NFR BLD AUTO: 48.7 % (ref 53–65)
NONHDLC SERPL-MCNC: 161 MG/DL
NRBC # BLD AUTO: 0 X10E3/UL
NRBC, AUTO (.00): 0 %
PLATELET # BLD AUTO: 276 K/UL (ref 150–400)
POTASSIUM SERPL-SCNC: 3.9 MMOL/L (ref 3.5–5.1)
PROT SERPL-MCNC: 6.5 G/DL (ref 6.4–8.2)
RBC # BLD AUTO: 4.77 M/UL (ref 4.2–5.4)
SODIUM SERPL-SCNC: 146 MMOL/L (ref 136–145)
TRIGL SERPL-MCNC: 274 MG/DL (ref 35–150)
TSH SERPL DL<=0.005 MIU/L-ACNC: 1.82 UIU/ML (ref 0.36–3.74)
VLDLC SERPL-MCNC: 55 MG/DL
WBC # BLD AUTO: 6.62 K/UL (ref 4.5–11)

## 2024-09-03 PROCEDURE — 1159F MED LIST DOCD IN RCRD: CPT | Mod: ,,, | Performed by: NURSE PRACTITIONER

## 2024-09-03 PROCEDURE — 3075F SYST BP GE 130 - 139MM HG: CPT | Mod: ,,, | Performed by: NURSE PRACTITIONER

## 2024-09-03 PROCEDURE — 3079F DIAST BP 80-89 MM HG: CPT | Mod: ,,, | Performed by: NURSE PRACTITIONER

## 2024-09-03 PROCEDURE — 99214 OFFICE O/P EST MOD 30 MIN: CPT | Mod: ,,, | Performed by: NURSE PRACTITIONER

## 2024-09-03 PROCEDURE — 1160F RVW MEDS BY RX/DR IN RCRD: CPT | Mod: ,,, | Performed by: NURSE PRACTITIONER

## 2024-09-03 PROCEDURE — 80061 LIPID PANEL: CPT | Mod: ,,, | Performed by: CLINICAL MEDICAL LABORATORY

## 2024-09-03 PROCEDURE — 80050 GENERAL HEALTH PANEL: CPT | Mod: ,,, | Performed by: CLINICAL MEDICAL LABORATORY

## 2024-09-03 PROCEDURE — 83036 HEMOGLOBIN GLYCOSYLATED A1C: CPT | Mod: ,,, | Performed by: CLINICAL MEDICAL LABORATORY

## 2024-09-03 PROCEDURE — 3008F BODY MASS INDEX DOCD: CPT | Mod: ,,, | Performed by: NURSE PRACTITIONER

## 2024-09-03 RX ORDER — KETOCONAZOLE 20 MG/G
CREAM TOPICAL
COMMUNITY
Start: 2024-08-16

## 2024-09-03 NOTE — PROGRESS NOTES
[facility]  [address] [place] [address]  Phone: [phone]  Fax: [phone]       PATIENT NAME: Imelda Anderson   : 1965    AGE: 58 y.o. DATE OF ENCOUNTER: 9/3/24    MRN: [id number]      PCP: Annie Grigsby FNP    Subjective:     Reason for Visit / Chief Complaint:     274}  Chief Complaint   Patient presents with    Hyperlipidemia     Patient reports to the clinic for 3 month follow up.    Rash     Patient c/o rash under and between both breasts for the past month.    Health Maintenance     Care gaps addressed, patient would like to set up Low Dose CT, declines all vaccines today.    Susana Valenzuela, CMA     3 mth f/u HLD, no problems with med but only taking rosuvastatin every other day.    Rash beneath breasts x1 mth.  Saw someone at [facility].  No help with nystatin powder and diflucan daily x3 days with no change then called and was changed to an antibiotic, but doesn't know what it was; states antibiotic was helping but after completing 7 day rx rash flared right back up then was changed to another type cream with no help.  Reports yesterday rash was flared up and it causes mole between breasts to look differently    I reviewed with the patient the [place] Preventative Services Task Force Recommendation on Lung Cancer Screening With Low-Dose Computed Tomography.    Patient meets eligibility criteria as per current CMS guidelines & USPTF recommendations of age 50-80 with at least a 20 pack year smoking history who currently smoke or have within the past 15 yrs.  58 y.o. current smoker; reports is weaning and smoking 80% less than she was.  Denies s/s of lung cancer including weight loss, night sweats, increasing SOB, pain, loss of appetite, different/changing cough, or hemoptysis.  Pack Years: 21.2     CT chest in the past 12 months: no    Benefits and harms of screening discussed with patient, including follow-up diagnostic testing, over-diagnosis,  false positive rate, and total radiation exposure.  Patient counseled on the importance of adherence to annual lung cancer LDCT screening as long as continues to meet criteria, impact of co-morbidities, and ability or willingness to undergo diagnosis and treatment.  Patient counseled on the importance of maintaining cigarette smoking abstinence if former smoker; or the importance of smoking cessation if current smoker and, if appropriate, furnishing of information about tobacco cessation interventions  All questions answered. Voices understanding.  Patient wishes to proceed with initial/baseline testing.    Review of Systems:     Review of Systems   Constitutional: Negative.    HENT: Negative.     Respiratory: Negative.     Cardiovascular: Negative.    Gastrointestinal: Negative.    Genitourinary: Negative.    Skin:  Positive for rash.   Neurological: Negative.    Psychiatric/Behavioral: Negative.  Negative for self-injury and suicidal ideas.        Allergies and Meds: 274}     Review of patient's allergies indicates:  No Known Allergies     Current Outpatient Medications   Medication Sig Dispense Refill    buPROPion (WELLBUTRIN XL) 300 MG 24 hr tablet Take 1 tablet (300 mg total) by mouth once daily. 90 tablet 1    cholecalciferol, vitamin D3, 125 mcg (5,000 unit) Tab Take 1 tablet (5,000 Units total) by mouth once daily. 90 tablet 3    ketoconazole (NIZORAL) 2 % cream Apply topically.      rosuvastatin (CRESTOR) 10 MG tablet Take 1 tablet (10 mg total) by mouth once daily. 90 tablet 3     No current facility-administered medications for this visit.       Labs:274}   I have reviewed labs below:    Lab Results   Component Value Date    WBC 5.69 04/10/2023    RBC 5.06 04/10/2023    HGB 14.4 04/10/2023    HCT 43.4 04/10/2023     04/10/2023     04/10/2023    K 4.1 04/10/2023     (H) 04/10/2023    CALCIUM 9.2 04/10/2023    GLU 90 04/10/2023    BUN 11 04/10/2023    CREATININE 0.66 04/10/2023     "ESTGFRAFRICA 94 12/15/2020    EGFRNONAA 77 12/15/2020    ALT 40 04/10/2023    AST 34 04/10/2023    CHOL 191 05/29/2024    TRIG 148 05/29/2024    HDL 36 (L) 05/29/2024    LDLCALC 125 05/29/2024    TSH 2.010 04/10/2023    HGBA1C 5.8 05/25/2023       Point Of Care Testing (if applicable):  Lab Results   Component Value Date    MOLSTREPAPOC Negative 05/26/2024     Any diagnostic testing results obtained in office or prior to appointment were reviewed were reviewed with patient.    Medical History: 274}     Past Medical History:   Diagnosis Date    Anxiety       Social History     Tobacco Use   Smoking Status Some Days    Current packs/day: 0.50    Average packs/day: 0.5 packs/day for 42.5 years (21.2 ttl pk-yrs)    Types: Cigarettes    Start date: 3/11/1982    Passive exposure: Current   Smokeless Tobacco Current   Tobacco Comments    3-5 cigs per day.       Past Surgical History:   Procedure Laterality Date    HYSTERECTOMY      TUBAL LIGATION  2010        Health Maintenance:      Health Maintenance Topics with due status: Not Due       Topic Last Completion Date    TETANUS VACCINE 05/25/2023    Lipid Panel 05/29/2024       Objective:  274}   Vital Signs  Temp: 98.3 °F (36.8 °C)  Temp Source: Oral  Pulse: 73  Resp: 18  SpO2: 100 %  BP: 132/80  BP Location: Left arm  Patient Position: Sitting  Height and Weight  Height: 5' 10" (177.8 cm)  Weight: 91.8 kg (202 lb 6.4 oz)  BSA (Calculated - sq m): 2.13 sq meters  BMI (Calculated): 29  Weight in (lb) to have BMI = 25: 173.9    Over the last two weeks how often have you been bothered by little interest or pleasure in doing things: 0  Over the last two weeks how often have you been bothered by feeling down, depressed or hopeless: 0  PHQ-2 Total Score: 0    Wt Readings from Last 3 Encounters:   09/03/24 91.8 kg (202 lb 6.4 oz)   05/29/24 88.9 kg (196 lb)   05/26/24 92.5 kg (204 lb)     Physical Exam  Vitals and nursing note reviewed.   Constitutional:       General: She is not " in acute distress.     Appearance: Normal appearance. She is not ill-appearing.   HENT:      Head: Normocephalic.   Eyes:      Conjunctiva/sclera: Conjunctivae normal.   Neck:      Trachea: Trachea normal.   Cardiovascular:      Rate and Rhythm: Normal rate and regular rhythm.      Pulses: Normal pulses.      Heart sounds: Normal heart sounds.   Pulmonary:      Effort: Pulmonary effort is normal. No respiratory distress.      Breath sounds: Normal breath sounds. No wheezing, rhonchi or rales.   Musculoskeletal:      Cervical back: Neck supple.      Right lower leg: No edema.      Left lower leg: No edema.   Lymphadenopathy:      Cervical: No cervical adenopathy.   Skin:     General: Skin is warm and dry.      Coloration: Skin is not jaundiced or pale.      Findings: Rash (see pic below of rash between breast that extends beneath breasts bilaterally, reports mole between breasts is changing color and size) present.   Neurological:      General: No focal deficit present.      Mental Status: She is alert and oriented to person, place, and time.      Gait: Gait normal.   Psychiatric:         Mood and Affect: Mood normal.         Behavior: Behavior normal.          Assessment and Plan: 274}     1. Other hyperlipidemia  Assessment & Plan:  Not controlled with low HDL of 36 on check 05/29/2024 and .  no problems with med but only taking rosuvastatin every other day  Recheck fasting lipids today.    Orders:  -     Comprehensive Metabolic Panel; Future; Expected date: 09/03/2024  -     Lipid Panel; Future; Expected date: 09/03/2024  -     TSH; Future; Expected date: 09/03/2024    2. Prediabetes  Assessment & Plan:  Lab Results   Component Value Date    HGBA1C 5.8 05/25/2023     Recheck A1c today.    Orders:  -     Hemoglobin A1C; Future; Expected date: 09/03/2024    3. Encounter for long-term (current) use of other medications  -     CBC Auto Differential; Future; Expected date: 09/03/2024  -     Comprehensive  Metabolic Panel; Future; Expected date: 09/03/2024    4. Skin eruption  Assessment & Plan:  Recommend OTC selenium sulfide (selsun blue) apply to affected area and surrounding margins x10 min daily then wash off.  Repeat daily x2 wks.    Refer to Dermatology for dark, changing mole beneath breasts and for 2nd opinion on treatment of rash if no resolution with above.    Orders:  -     Ambulatory referral/consult to Dermatology; Future; Expected date: 09/10/2024    5. Encounter for screening for lung cancer  -     CT Chest Lung Screening Low Dose; Future; Expected date: 09/03/2024    6. Nicotine dependence, cigarettes, uncomplicated  Assessment & Plan:  Strongly advised smoking cessation.  Schedule baseline LDCT.    Orders:  -     CT Chest Lung Screening Low Dose; Future; Expected date: 09/03/2024    7. Change in color of skin mole  -     Ambulatory referral/consult to Dermatology; Future; Expected date: 09/10/2024    Will call back in am with name of the antibiotic she took and the cream she is currently using.  Diagnosis, risks, benefits, and side effects of any meds and treatment plan were discussed with the patient.  Patient to call or follow-up with any new or worsening symptoms or problems prior to next appointment.  Go to ER for any urgent complications.  All questions were answered to the satisfaction of the patient, and pt verbalized understanding and agreement to treatment plan.      Follow up for as scheduled and as needed.    Signature:  AMY Victoria-BC    Future Appointments   Date Time Provider Department Center   10/14/2024  9:40 AM [facility] MAMMO1 OB [facility]   11/21/2024  7:20 AM Annie Grigsby FNP [facility]   12/19/2024  7:00 AM [facility] GI ROOM 03 [facility]   6/5/2025  7:20 AM Annie Grigsby FNP [facility]

## 2024-09-03 NOTE — ASSESSMENT & PLAN NOTE
Recommend OTC selenium sulfide (selsun blue) apply to affected area and surrounding margins x10 min daily then wash off.  Repeat daily x2 wks.    Refer to Dermatology for dark, changing mole beneath breasts and for 2nd opinion on treatment of rash if no resolution with above.

## 2024-09-03 NOTE — ASSESSMENT & PLAN NOTE
Not controlled with low HDL of 36 on check 05/29/2024 and .  no problems with med but only taking rosuvastatin every other day  Recheck fasting lipids today.

## 2024-09-03 NOTE — PATIENT INSTRUCTIONS
"Patient Education       Quitting Smoking   The Basics   Written by the doctors and editors at [facility]   What are the benefits of quitting smoking? -- Quitting smoking can dramatically improve your health and help you live longer. It lowers your risk of heart disease, lung disease, kidney failure, infection, and cancer.   Quitting smoking can also lower your chances of getting osteoporosis, a condition that makes your bones weak. Plus, it can help your skin look younger and reduce the chances that you will have problems with sex.  Quitting is not easy for most people, and it can take several tries to completely quit. But help and support are available. Quitting smoking will improve your health no matter how old you are, even if you have smoked for a long time.   What should I do if I want to quit smoking? -- It's a good idea to start by talking with your doctor or nurse. It is possible to quit on your own, without help. But getting help greatly increases your chances of quitting successfully.  When you are ready to quit, you will make a plan to:  Set a quit date  Tell your family and friends that you plan to quit  Plan ahead for the challenges you will face, such as cigarette cravings  Remove cigarettes from your home, car, and work  How can my doctor or nurse help? -- Your doctor or nurse can give you advice on the best way to quit. They can also give you medicines to:  Reduce your craving for cigarettes  Reduce your "withdrawal" symptoms (symptoms that happen when you stop smoking)  Your doctor or nurse can also help you find a counselor to talk to. For most people who are trying to quit smoking, it works best to use both medicines and counseling.  You can also get help from a free phone line (7-275-QUIT-NOW or 1-552.948.6699) or go online to www.smokefree.gov.  What are the symptoms of withdrawal? -- When you stop smoking, you might have symptoms such as:  Trouble sleeping  Feeling irritable, anxious, or " restless  Getting frustrated or angry  Having trouble thinking clearly  These symptoms can be hard to deal with, which is why it can be so hard to quit. But medicines can help.  Some people who stop smoking become temporarily depressed. Some people need treatment for depression, such as counseling or medicines or both. People with depression might:  No longer enjoy or care about doing the things they used to like to do  Feel sad, down, hopeless, nervous, or cranky most of the day, almost every day  Lose or gain weight  Sleep too much or too little  Feel tired or like they have no energy  Feel guilty or like they are worth nothing  Forget things or feel confused  Move and speak more slowly than usual  Act restless or have trouble staying still  Think about death or suicide  If you think you might be depressed, tell your doctor or nurse right away. They can talk to you about your symptoms and recommend treatment if needed.  If you ever feel like you might hurt yourself, go straight to the nearest emergency department or call for an ambulance (in the [place] and [place], dial 9-1-1). You can also call your doctor or nurse and tell them it is an emergency, or reach the  National Suicide Prevention Lifeline at 1-212.294.3375 or www.suicidepreventionlifeline.org.  How does counseling work? -- A counselor can help you figure out:  What triggers you to want to smoke, and how to handle these situations  How to resist cravings  What you can do differently if you have tried to quit before  You can meet with a counselor in one-on-one sessions or as part of a group. There are other ways to get counseling, too, such as over the phone, through text messaging, or online.   How do medicines help you stop smoking? -- Different medicines work in different ways:  Nicotine replacement therapy - Nicotine is the main drug in cigarettes, and the reason they are addictive. These medicines reduce your body's craving for nicotine. They also help  "with withdrawal symptoms.  There are different forms of nicotine replacement, including skin patches, lozenges, gum, nasal sprays, and inhalers. Most can be bought without a prescription. Also, health insurance might cover some or all of the cost.  It often helps to use 2 forms of nicotine replacement. For example, you might wear a patch all the time, plus use gum or lozenges when you get a craving to smoke.  Varenicline - Varenicline (brand names: Chantix, Champix) is a prescription medicine that reduces withdrawal symptoms and cigarette cravings. Varenicline can increase the effects of alcohol in some people. It's a good idea to limit drinking while you're taking it, at least until you know how it affects you.  Even if you are not yet ready to commit to a quit date, varenicline can help reduce cravings. This can make it easier to quit when you are ready.  Bupropion - Bupropion (sample brand names: Wellbutrin, Zyban) is a prescription medicine that reduces your desire to smoke. It is also available in a generic version, which is cheaper than the brand name medicines. Doctors do not usually prescribe bupropion for people with seizures or who have had seizures in the past.  It might also be helpful to combine nicotine replacement with bupropion or varenicline. In some cases, a person might even take both bupropion and varenicline. Your doctor or nurse can help you figure out the best combination for you.  What about e-cigarettes? -- Sometimes people wonder if using electronic cigarettes, or "e-cigarettes," can help them quit smoking. Using e-cigarettes is also called "vaping." Doctors do not recommend e-cigarettes in place of medicines and counseling. That's because e-cigarettes still contain nicotine as well as other substances that might be harmful. It's also not clear how they can affect a person's health in the long term.  What if I am pregnant and I smoke? -- If you are pregnant, it's really important for the " health of your baby that you quit. Ask your doctor what options you have, and what is safest for your baby.  Will I gain weight if I quit? -- You might gain a few pounds. This can be frustrating for some people, but it's important to remember that you are improving your health by quitting smoking. You can help prevent gaining a lot of weight by staying active and eating a healthy diet.  What if I am not able to quit? -- If you don't quit on your first try, or if you quit but then start smoking again, don't give up hope. Lots of people have to try more than once before they are able to completely quit.  It might help to try to understand why quitting did not work. There might be something you can do differently when you try again. It can help to figure out what situations make you want to smoke, so you can avoid them.   What else can I do to improve my chances of quitting? -- You can:  Get regular exercise. Any type of physical activity, even gentle forms of movement, is good for your health. Physical activity can also help reduce stress.  Stay away from smokers and places that make you want to smoke. If people close to you smoke, ask them to quit with you or avoid smoking around you.  Carry gum, hard candy, or something to put in your mouth. If you get a craving for a cigarette, try one of these instead.  Don't give up, even if you start smoking again. It takes most people a few tries before they succeed.  All topics are updated as new evidence becomes available and our peer review process is complete.  This topic retrieved from [employer] on: Sep 21, 2021.  Topic 44679 Version 22.0  Release: 29.4.2 - C29.263  © 2021 UpToDate, Inc. and/or its affiliates. All rights reserved.  Consumer Information Use and Disclaimer   This information is not specific medical advice and does not replace information you receive from your health care provider. This is only a brief summary of general information. It does NOT include all  information about conditions, illnesses, injuries, tests, procedures, treatments, therapies, discharge instructions or life-style choices that may apply to you. You must talk with your health care provider for complete information about your health and treatment options. This information should not be used to decide whether or not to accept your health care provider's advice, instructions or recommendations. Only your health care provider has the knowledge and training to provide advice that is right for you. The use of this information is governed by the [employer] End User License Agreement, available at https://www.[employer]/en/solutions/[employer]/about/robert.The use of [employer] content is governed by the [employer] Terms of Use. ©2021 [employer] All rights reserved.  Copyright   © 2021 UpToDate, Inc. and/or its affiliates. All rights reserved.

## 2024-09-04 ENCOUNTER — TELEPHONE (OUTPATIENT)
Dept: FAMILY MEDICINE | Facility: CLINIC | Age: 59
End: 2024-09-04
Payer: COMMERCIAL

## 2024-09-04 NOTE — TELEPHONE ENCOUNTER
----- Message from AMY Victoria sent at 9/3/2024  2:31 PM CDT -----  Please schedule dermatology referral and LDCT.

## 2024-09-04 NOTE — TELEPHONE ENCOUNTER
LDCT scan schedule for Sept 26 at 3:30. Reminder mailed to patient.  Faxed records to [facility] for dermatology referral

## 2024-10-15 ENCOUNTER — HOSPITAL ENCOUNTER (OUTPATIENT)
Dept: RADIOLOGY | Facility: HOSPITAL | Age: 59
Discharge: HOME | End: 2024-10-15
Attending: NURSE PRACTITIONER
Payer: COMMERCIAL

## 2024-10-15 VITALS — BODY MASS INDEX: 27.2 KG/M2 | HEIGHT: 70 IN | WEIGHT: 190 LBS

## 2024-10-15 DIAGNOSIS — Z12.31 OTHER SCREENING MAMMOGRAM: ICD-10-CM

## 2024-10-15 PROCEDURE — 77063 BREAST TOMOSYNTHESIS BI: CPT | Mod: 26,,, | Performed by: RADIOLOGY

## 2024-10-15 PROCEDURE — 77067 SCR MAMMO BI INCL CAD: CPT | Mod: TC

## 2024-10-15 PROCEDURE — 77067 SCR MAMMO BI INCL CAD: CPT | Mod: 26,,, | Performed by: RADIOLOGY

## 2024-12-26 DIAGNOSIS — Z12.11 SCREENING FOR COLON CANCER: Primary | ICD-10-CM

## 2025-01-02 RX ORDER — POLYETHYLENE GLYCOL 3350, SODIUM SULFATE ANHYDROUS, SODIUM BICARBONATE, SODIUM CHLORIDE, POTASSIUM CHLORIDE 236; 22.74; 6.74; 5.86; 2.97 G/4L; G/4L; G/4L; G/4L; G/4L
4 POWDER, FOR SOLUTION ORAL ONCE
Qty: 4000 ML | Refills: 0 | Status: SHIPPED | OUTPATIENT
Start: 2025-01-02 | End: 2025-01-02

## 2025-03-25 ENCOUNTER — HOSPITAL ENCOUNTER (EMERGENCY)
Facility: HOSPITAL | Age: 60
Discharge: HOME | End: 2025-03-26
Attending: EMERGENCY MEDICINE
Payer: COMMERCIAL

## 2025-03-25 DIAGNOSIS — R07.89 CHEST WALL PAIN: Primary | ICD-10-CM

## 2025-03-25 DIAGNOSIS — R07.9 CHEST PAIN: ICD-10-CM

## 2025-03-25 PROCEDURE — 99285 EMERGENCY DEPT VISIT HI MDM: CPT | Mod: 25

## 2025-03-25 PROCEDURE — 36415 COLL VENOUS BLD VENIPUNCTURE: CPT | Performed by: EMERGENCY MEDICINE

## 2025-03-25 PROCEDURE — 84484 ASSAY OF TROPONIN QUANT: CPT | Performed by: EMERGENCY MEDICINE

## 2025-03-25 PROCEDURE — 85025 COMPLETE CBC W/AUTO DIFF WBC: CPT | Performed by: EMERGENCY MEDICINE

## 2025-03-25 PROCEDURE — 80053 COMPREHEN METABOLIC PANEL: CPT | Performed by: EMERGENCY MEDICINE

## 2025-03-25 PROCEDURE — 93005 ELECTROCARDIOGRAM TRACING: CPT

## 2025-03-25 PROCEDURE — 93010 ELECTROCARDIOGRAM REPORT: CPT | Mod: ,,, | Performed by: INTERNAL MEDICINE

## 2025-03-25 PROCEDURE — 83880 ASSAY OF NATRIURETIC PEPTIDE: CPT | Performed by: EMERGENCY MEDICINE

## 2025-03-26 ENCOUNTER — TELEPHONE (OUTPATIENT)
Dept: EMERGENCY MEDICINE | Facility: HOSPITAL | Age: 60
End: 2025-03-26
Payer: COMMERCIAL

## 2025-03-26 VITALS
OXYGEN SATURATION: 99 % | WEIGHT: 210 LBS | HEIGHT: 70 IN | HEART RATE: 67 BPM | TEMPERATURE: 99 F | DIASTOLIC BLOOD PRESSURE: 52 MMHG | BODY MASS INDEX: 30.06 KG/M2 | SYSTOLIC BLOOD PRESSURE: 105 MMHG | RESPIRATION RATE: 16 BRPM

## 2025-03-26 LAB
ALBUMIN SERPL BCP-MCNC: 3.8 G/DL (ref 3.4–4.8)
ALBUMIN/GLOB SERPL: 1.7 {RATIO}
ALP SERPL-CCNC: 93 U/L (ref 40–150)
ALT SERPL W P-5'-P-CCNC: 16 U/L
ANION GAP SERPL CALCULATED.3IONS-SCNC: 14 MMOL/L (ref 7–16)
AST SERPL W P-5'-P-CCNC: 19 U/L (ref 11–45)
BASOPHILS # BLD AUTO: 0.01 K/UL (ref 0–0.2)
BASOPHILS NFR BLD AUTO: 0.2 % (ref 0–1)
BILIRUB SERPL-MCNC: 0.2 MG/DL
BUN SERPL-MCNC: 10 MG/DL (ref 10–20)
BUN/CREAT SERPL: 12 (ref 6–20)
CALCIUM SERPL-MCNC: 8.8 MG/DL (ref 8.4–10.2)
CHLORIDE SERPL-SCNC: 109 MMOL/L (ref 98–107)
CO2 SERPL-SCNC: 24 MMOL/L (ref 23–31)
CREAT SERPL-MCNC: 0.84 MG/DL (ref 0.55–1.02)
DIFFERENTIAL METHOD BLD: ABNORMAL
EGFR (NO RACE VARIABLE) (RUSH/TITUS): 79 ML/MIN/1.73M2
EOSINOPHIL # BLD AUTO: 0.14 K/UL (ref 0–0.5)
EOSINOPHIL NFR BLD AUTO: 2.3 % (ref 1–4)
ERYTHROCYTE [DISTWIDTH] IN BLOOD BY AUTOMATED COUNT: 12.7 % (ref 11.5–14.5)
GLOBULIN SER-MCNC: 2.3 G/DL (ref 2–4)
GLUCOSE SERPL-MCNC: 88 MG/DL (ref 82–115)
HCT VFR BLD AUTO: 40.2 % (ref 38–47)
HGB BLD-MCNC: 13.1 G/DL (ref 12–16)
IMM GRANULOCYTES # BLD AUTO: 0.02 K/UL (ref 0–0.04)
IMM GRANULOCYTES NFR BLD: 0.3 % (ref 0–0.4)
LYMPHOCYTES # BLD AUTO: 2.47 K/UL (ref 1–4.8)
LYMPHOCYTES NFR BLD AUTO: 39.8 % (ref 27–41)
MCH RBC QN AUTO: 28.2 PG (ref 27–31)
MCHC RBC AUTO-ENTMCNC: 32.6 G/DL (ref 32–36)
MCV RBC AUTO: 86.5 FL (ref 80–96)
MONOCYTES # BLD AUTO: 0.43 K/UL (ref 0–0.8)
MONOCYTES NFR BLD AUTO: 6.9 % (ref 2–6)
MPC BLD CALC-MCNC: 9.7 FL (ref 9.4–12.4)
NEUTROPHILS # BLD AUTO: 3.14 K/UL (ref 1.8–7.7)
NEUTROPHILS NFR BLD AUTO: 50.5 % (ref 53–65)
NRBC # BLD AUTO: 0 X10E3/UL
NRBC, AUTO (.00): 0 %
NT-PROBNP SERPL-MCNC: 11 PG/ML (ref 1–125)
PLATELET # BLD AUTO: 239 K/UL (ref 150–400)
POTASSIUM SERPL-SCNC: 3.6 MMOL/L (ref 3.5–5.1)
PROT SERPL-MCNC: 6.1 G/DL (ref 5.8–7.6)
RBC # BLD AUTO: 4.65 M/UL (ref 4.2–5.4)
SODIUM SERPL-SCNC: 143 MMOL/L (ref 136–145)
TROPONIN I SERPL HS-MCNC: <2.7 NG/L
WBC # BLD AUTO: 6.21 K/UL (ref 4.5–11)

## 2025-03-26 PROCEDURE — 63600175 PHARM REV CODE 636 W HCPCS: Mod: JZ,TB | Performed by: EMERGENCY MEDICINE

## 2025-03-26 PROCEDURE — 96372 THER/PROPH/DIAG INJ SC/IM: CPT | Performed by: EMERGENCY MEDICINE

## 2025-03-26 RX ORDER — TIZANIDINE 4 MG/1
4 TABLET ORAL EVERY 6 HOURS PRN
Qty: 30 TABLET | Refills: 0 | Status: SHIPPED | OUTPATIENT
Start: 2025-03-26 | End: 2025-04-05

## 2025-03-26 RX ORDER — KETOROLAC TROMETHAMINE 30 MG/ML
30 INJECTION, SOLUTION INTRAMUSCULAR; INTRAVENOUS
Status: COMPLETED | OUTPATIENT
Start: 2025-03-26 | End: 2025-03-26

## 2025-03-26 RX ORDER — KETOROLAC TROMETHAMINE 30 MG/ML
30 INJECTION, SOLUTION INTRAMUSCULAR; INTRAVENOUS
Status: DISCONTINUED | OUTPATIENT
Start: 2025-03-26 | End: 2025-03-26

## 2025-03-26 RX ORDER — DICLOFENAC SODIUM 50 MG/1
50 TABLET, DELAYED RELEASE ORAL 3 TIMES DAILY
Qty: 30 TABLET | Refills: 0 | Status: SHIPPED | OUTPATIENT
Start: 2025-03-26

## 2025-03-26 RX ADMIN — KETOROLAC TROMETHAMINE 30 MG: 30 INJECTION, SOLUTION INTRAMUSCULAR at 01:03

## 2025-03-26 NOTE — ED NOTES
Report received from AMOS Farr.  Pt resting in bed with HOB elevated. Pt states still having some pressure to center of chest and rates it 8/10.  Instructed still awaiting all lab results and MD will review for any needed treatment.  Pt voiced understanding and denies any additional problems.  Call light left in reach.

## 2025-03-26 NOTE — ED PROVIDER NOTES
"Encounter Date: 3/25/2025    SCRIBE #1 NOTE: I, Cary Tulio, am scribing for, and in the presence of,  Abelino Carrington MD.       History     Chief Complaint   Patient presents with    Chest Pain     This 59 y.o. female pt presents to the ED with Chest Pain. The pt reports having chest pain that started a few days ago. Pt did say she had, "a very bad coughing fit", the day before her chest pain started. Pt said her chest pain gets worst when she bends over to  anything. Pt had Family hx of heart problems but has never had heart problems herself. Pt is SOB and Nausea when the chest pain occurs. Pt said her chest pain feels like a pressure in the center of her chest and is tender to the touch here in the ED.      The history is provided by the patient. No  was used.     Review of patient's allergies indicates:  No Known Allergies  Past Medical History:   Diagnosis Date    Anxiety      Past Surgical History:   Procedure Laterality Date    HYSTERECTOMY      TUBAL LIGATION  2010     Family History   Problem Relation Name Age of Onset    Breast cancer Mother Jacque Saldana     Pulmonary embolism Mother Jacque Saldana         complication of COVID    Arthritis Mother Jacque Saldana     Cancer Mother Jacque Saldana     Heart failure Mother Jacque Saldana     Heart disease Father Richard Saldana     Diabetes Sister      Diabetes Sister Dot     Gout Sister Dot     Hypertension Sister Andreea     Heart disease Sister Andreea     No Known Problems Sister Kaylin     No Known Problems Sister Jaclyn     Depression Sister Polina Pearce     Prostate cancer Brother sanjay     Hypertension Brother      Prostate cancer Brother      Alcohol abuse Brother Marcio     Hypertension Brother Lenard     Cancer Brother      Stroke Brother adia     Hypertension Daughter      Crohn's disease Son       Social History[1]  Review of Systems   Constitutional:  Negative for activity change, appetite change, chills, diaphoresis and fever.   HENT:  " Negative for congestion, drooling, ear pain, mouth sores, rhinorrhea, sinus pain, sore throat and trouble swallowing.    Eyes:  Negative for pain and discharge.   Respiratory:  Positive for cough and shortness of breath. Negative for chest tightness, wheezing and stridor.    Cardiovascular:  Positive for chest pain. Negative for palpitations and leg swelling.   Gastrointestinal:  Positive for nausea. Negative for abdominal distention, abdominal pain, blood in stool, constipation, diarrhea and vomiting.   Genitourinary:  Negative for difficulty urinating, dysuria, flank pain, frequency, hematuria and urgency.   Musculoskeletal:  Negative for arthralgias, back pain and myalgias.   Skin:  Negative for pallor, rash and wound.   Neurological:  Negative for dizziness, syncope, weakness, light-headedness and numbness.   All other systems reviewed and are negative.      Physical Exam     Initial Vitals   BP Pulse Resp Temp SpO2   03/25/25 2251 03/25/25 2251 03/25/25 2251 03/25/25 2305 03/25/25 2251   136/71 68 15 98.6 °F (37 °C) 98 %      MAP       --                Physical Exam    Nursing note and vitals reviewed.  HENT:   Head: Normocephalic and atraumatic. Mouth/Throat: Oropharynx is clear and moist.   Eyes: Pupils are equal, round, and reactive to light.   Neck: Neck supple.   Normal range of motion.  Cardiovascular:  Normal rate and regular rhythm.           Pulmonary/Chest: Effort normal and breath sounds normal. She exhibits tenderness.   Abdominal: Abdomen is soft. She exhibits no distension.   Musculoskeletal:         General: Normal range of motion.      Cervical back: Normal range of motion and neck supple.     Neurological: She is alert.   Skin: Skin is warm. Capillary refill takes less than 2 seconds.   Psychiatric: She has a normal mood and affect.         ED Course   Procedures  Labs Reviewed   COMPREHENSIVE METABOLIC PANEL - Abnormal       Result Value    Sodium 143      Potassium 3.6      Chloride 109 (*)      CO2 24      Anion Gap 14      Glucose 88      BUN 10      Creatinine 0.84      BUN/Creatinine Ratio 12      Calcium 8.8      Total Protein 6.1      Albumin 3.8      Globulin 2.3      A/G Ratio 1.7      Bilirubin, Total 0.2      Alk Phos 93      ALT 16      AST 19      eGFR 79     CBC WITH DIFFERENTIAL - Abnormal    WBC 6.21      RBC 4.65      Hemoglobin 13.1      Hematocrit 40.2      MCV 86.5      MCH 28.2      MCHC 32.6      RDW 12.7      Platelet Count 239      MPV 9.7      Neutrophils % 50.5 (*)     Lymphocytes % 39.8      Monocytes % 6.9 (*)     Eosinophils % 2.3      Basophils % 0.2      Immature Granulocytes % 0.3      nRBC, Auto 0.0      Neutrophils, Abs 3.14      Lymphocytes, Absolute 2.47      Monocytes, Absolute 0.43      Eosinophils, Absolute 0.14      Basophils, Absolute 0.01      Immature Granulocytes, Absolute 0.02      nRBC, Absolute 0.00      Diff Type Auto     TROPONIN I - Normal    Troponin I High Sensitivity <2.7     NT-PRO NATRIURETIC PEPTIDE - Normal    ProBNP 11     CBC W/ AUTO DIFFERENTIAL    Narrative:     The following orders were created for panel order CBC auto differential.  Procedure                               Abnormality         Status                     ---------                               -----------         ------                     CBC with Differential[[id number]]       Abnormal            Final result                 Please view results for these tests on the individual orders.          Imaging Results              X-Ray Chest AP Portable (In process)                      Medications   ketorolac injection 30 mg (has no administration in time range)     Medical Decision Making  Amount and/or Complexity of Data Reviewed  Labs: ordered.  Radiology: ordered.              Attending Attestation:           Physician Attestation for Scribe:  Physician Attestation Statement for Scribe #1: I, Abelino Carrington MD, reviewed documentation, as scribed by Cary Antunez in my presence, and  it is both accurate and complete.             ED Course as of 03/26/25 0111   Tue Mar 25, 2025   2256 Medical decision-making:  Differential diagnosis includes chest pain, chest wall pain, pneumonia, STEMI, NSTEMI, ACS.  All testing ordered and interpreted by me.  Suspect chest wall pain based on symptoms starting the morning after patient had a night with a lot of coughing. [BB]   2256 EKG by my interpretation shows sinus rhythm, rate of 66, no acute ST segment changes. [BB]   Wed Mar 26, 2025   0108 Pro BNP is normal.  Troponin is normal.  CBC is normal.  CMP is normal. [BB]   0109 Chest x-ray by my interpretation shows no acute disease. [BB]      ED Course User Index  [BB] Abelino Carrington MD                           Clinical Impression:  Final diagnoses:  [R07.9] Chest pain  [R07.89] Chest wall pain (Primary)          ED Disposition Condition    Discharge Stable          ED Prescriptions       Medication Sig Dispense Start Date End Date Auth. Provider    diclofenac (VOLTAREN) 50 MG EC tablet Take 1 tablet (50 mg total) by mouth 3 (three) times daily. P.r.n. pain 30 tablet 3/26/2025 -- Abelino Carrington MD    tiZANidine (ZANAFLEX) 4 MG tablet Take 1 tablet (4 mg total) by mouth every 6 (six) hours as needed (P.r.n. neck pain). 30 tablet 3/26/2025 4/5/2025 Abelino Carrington MD          Follow-up Information    None              [1]   Social History  Tobacco Use    Smoking status: Some Days     Current packs/day: 0.50     Average packs/day: 0.5 packs/day for 43.0 years (21.5 ttl pk-yrs)     Types: Cigarettes     Start date: 3/11/1982     Passive exposure: Current    Smokeless tobacco: Current    Tobacco comments:     3-5 cigs per day.    Substance Use Topics    Alcohol use: Never    Drug use: Never        Abelino Carrington MD  03/26/25 0112

## 2025-04-01 LAB
OHS QRS DURATION: 92 MS
OHS QTC CALCULATION: 421 MS

## 2025-04-08 DIAGNOSIS — Z12.11 SCREENING FOR COLON CANCER: Primary | ICD-10-CM

## 2025-04-08 RX ORDER — POLYETHYLENE GLYCOL 3350, SODIUM SULFATE ANHYDROUS, SODIUM BICARBONATE, SODIUM CHLORIDE, POTASSIUM CHLORIDE 236; 22.74; 6.74; 5.86; 2.97 G/4L; G/4L; G/4L; G/4L; G/4L
4 POWDER, FOR SOLUTION ORAL ONCE
Qty: 4000 ML | Refills: 0 | Status: SHIPPED | OUTPATIENT
Start: 2025-04-08 | End: 2025-04-08

## 2025-04-22 ENCOUNTER — ANESTHESIA EVENT (OUTPATIENT)
Dept: GASTROENTEROLOGY | Facility: HOSPITAL | Age: 60
End: 2025-04-22

## 2025-04-22 ENCOUNTER — ANESTHESIA (OUTPATIENT)
Dept: GASTROENTEROLOGY | Facility: HOSPITAL | Age: 60
End: 2025-04-22

## 2025-04-22 NOTE — ANESTHESIA PREPROCEDURE EVALUATION
04/22/2025  Imelda Anderson is a 59 y.o., female.  Medications Ordered Prior to Encounter[1]   Active Ambulatory Problems     Diagnosis Date Noted    Major depressive disorder, recurrent, moderate 04/2023    Nicotine dependence, cigarettes, uncomplicated     Vitamin D deficiency     Need for shingles vaccine 05/29/2024    Screening for malignant neoplasm of colon 05/29/2024    Sore throat 05/30/2024    Other hyperlipidemia 06/03/2024    Skin eruption 09/03/2024    Prediabetes 09/03/2024     Resolved Ambulatory Problems     Diagnosis Date Noted    Acute non-recurrent maxillary sinusitis 10/26/2021    Acute non-recurrent maxillary sinusitis 05/30/2024     Past Medical History:   Diagnosis Date    Anxiety       Review of patient's allergies indicates:  No Known Allergies   Past Surgical History:   Procedure Laterality Date    HYSTERECTOMY      TUBAL LIGATION  2010      Social Drivers of Health     Tobacco Use: High Risk (3/25/2025)    Patient History     Smoking Tobacco Use: Some Days     Smokeless Tobacco Use: Current     Passive Exposure: Current   Alcohol Use: Not on file   Financial Resource Strain: Not on file   Food Insecurity: Not on file   Transportation Needs: Not on file   Physical Activity: Not on file   Stress: Not on file   Housing Stability: Not on file   Depression: Low Risk  (9/3/2024)    Depression     Last PHQ-4: Flowsheet Data: 0   Utilities: Not on file   Health Literacy: Not on file   Social Isolation: Not on file        Pre-op Assessment    I have reviewed the Patient Summary Reports.     I have reviewed the Nursing Notes. I have reviewed the NPO Status.   I have reviewed the Medications.     Review of Systems  Anesthesia Hx:             Denies Family Hx of Anesthesia complications.    Denies Personal Hx of Anesthesia complications.                    Psych:  Psychiatric History                   Physical Exam  General: Well nourished, Cooperative, Alert and Oriented    Airway:  Mallampati: II   Mouth Opening: Normal  TM Distance: Normal  Tongue: Normal  Neck ROM: Normal ROM        Anesthesia Plan  Type of Anesthesia, risks & benefits discussed:    Anesthesia Type: MAC  Intra-op Monitoring Plan: Standard ASA Monitors  Induction:  IV  Informed Consent: Informed consent signed with the Patient and all parties understand the risks and agree with anesthesia plan.  All questions answered. Patient consented to blood products? Yes  ASA Score: 3  Day of Surgery Review of History & Physical: H&P Update referred to the surgeon/provider.H&P completed by Anesthesiologist.    Ready For Surgery From Anesthesia Perspective.     .           [1]   Current Outpatient Medications on File Prior to Visit   Medication Sig Dispense Refill    buPROPion (WELLBUTRIN XL) 300 MG 24 hr tablet Take 1 tablet (300 mg total) by mouth once daily. 90 tablet 1    cholecalciferol, vitamin D3, 125 mcg (5,000 unit) Tab Take 1 tablet (5,000 Units total) by mouth once daily. 90 tablet 3    diclofenac (VOLTAREN) 50 MG EC tablet Take 1 tablet (50 mg total) by mouth 3 (three) times daily. P.r.n. pain 30 tablet 0    ketoconazole (NIZORAL) 2 % cream Apply topically.      rosuvastatin (CRESTOR) 10 MG tablet Take 1 tablet (10 mg total) by mouth once daily. 90 tablet 3     No current facility-administered medications on file prior to visit.

## 2025-06-05 ENCOUNTER — RESULTS FOLLOW-UP (OUTPATIENT)
Dept: FAMILY MEDICINE | Facility: CLINIC | Age: 60
End: 2025-06-05

## 2025-06-05 ENCOUNTER — OFFICE VISIT (OUTPATIENT)
Dept: FAMILY MEDICINE | Facility: CLINIC | Age: 60
End: 2025-06-05
Payer: COMMERCIAL

## 2025-06-05 VITALS
TEMPERATURE: 99 F | HEART RATE: 75 BPM | SYSTOLIC BLOOD PRESSURE: 112 MMHG | DIASTOLIC BLOOD PRESSURE: 71 MMHG | WEIGHT: 197.38 LBS | RESPIRATION RATE: 20 BRPM | OXYGEN SATURATION: 99 % | BODY MASS INDEX: 28.26 KG/M2 | HEIGHT: 70 IN

## 2025-06-05 DIAGNOSIS — Z00.00 ROUTINE GENERAL MEDICAL EXAMINATION AT A HEALTH CARE FACILITY: Primary | ICD-10-CM

## 2025-06-05 DIAGNOSIS — Z23 NEED FOR SHINGLES VACCINE: ICD-10-CM

## 2025-06-05 DIAGNOSIS — Z13.1 DIABETES MELLITUS SCREENING: ICD-10-CM

## 2025-06-05 DIAGNOSIS — Z13.220 SCREENING FOR HYPERLIPIDEMIA: ICD-10-CM

## 2025-06-05 DIAGNOSIS — E55.9 VITAMIN D DEFICIENCY: Chronic | ICD-10-CM

## 2025-06-05 DIAGNOSIS — J02.9 SORE THROAT: ICD-10-CM

## 2025-06-05 DIAGNOSIS — Z12.12 ENCOUNTER FOR COLORECTAL CANCER SCREENING: ICD-10-CM

## 2025-06-05 DIAGNOSIS — F33.1 MAJOR DEPRESSIVE DISORDER, RECURRENT, MODERATE: Chronic | ICD-10-CM

## 2025-06-05 DIAGNOSIS — E78.49 OTHER HYPERLIPIDEMIA: Chronic | ICD-10-CM

## 2025-06-05 DIAGNOSIS — Z12.11 ENCOUNTER FOR COLORECTAL CANCER SCREENING: ICD-10-CM

## 2025-06-05 DIAGNOSIS — E55.9 VITAMIN D DEFICIENCY: Primary | Chronic | ICD-10-CM

## 2025-06-05 DIAGNOSIS — F17.210 NICOTINE DEPENDENCE, CIGARETTES, UNCOMPLICATED: Chronic | ICD-10-CM

## 2025-06-05 DIAGNOSIS — Z12.11 SCREENING FOR MALIGNANT NEOPLASM OF COLON: ICD-10-CM

## 2025-06-05 DIAGNOSIS — R52 ACUTE GENERALIZED BODY PAIN: ICD-10-CM

## 2025-06-05 DIAGNOSIS — R73.03 PREDIABETES: ICD-10-CM

## 2025-06-05 PROBLEM — R21 SKIN ERUPTION: Status: RESOLVED | Noted: 2024-09-03 | Resolved: 2025-06-05

## 2025-06-05 LAB
25(OH)D3 SERPL-MCNC: 18.7 NG/ML (ref 30–80)
ALBUMIN SERPL BCP-MCNC: 4 G/DL (ref 3.4–4.8)
ALBUMIN/GLOB SERPL: 1.3 {RATIO}
ALP SERPL-CCNC: 112 U/L (ref 40–150)
ALT SERPL W P-5'-P-CCNC: 19 U/L
ANION GAP SERPL CALCULATED.3IONS-SCNC: 10 MMOL/L (ref 7–16)
AST SERPL W P-5'-P-CCNC: 21 U/L (ref 11–45)
BASOPHILS # BLD AUTO: 0.04 K/UL (ref 0–0.2)
BASOPHILS NFR BLD AUTO: 0.5 % (ref 0–1)
BILIRUB SERPL-MCNC: 0.6 MG/DL
BUN SERPL-MCNC: 8 MG/DL (ref 10–20)
BUN/CREAT SERPL: 9 (ref 6–20)
CALCIUM SERPL-MCNC: 9.3 MG/DL (ref 8.4–10.2)
CHLORIDE SERPL-SCNC: 109 MMOL/L (ref 98–107)
CHOLEST SERPL-MCNC: 217 MG/DL
CHOLEST/HDLC SERPL: 6.6 {RATIO}
CO2 SERPL-SCNC: 26 MMOL/L (ref 23–31)
CREAT SERPL-MCNC: 0.86 MG/DL (ref 0.55–1.02)
CTP QC/QA: YES
DIFFERENTIAL METHOD BLD: ABNORMAL
EGFR (NO RACE VARIABLE) (RUSH/TITUS): 77 ML/MIN/1.73M2
EOSINOPHIL # BLD AUTO: 0.1 K/UL (ref 0–0.5)
EOSINOPHIL NFR BLD AUTO: 1.3 % (ref 1–4)
ERYTHROCYTE [DISTWIDTH] IN BLOOD BY AUTOMATED COUNT: 12.5 % (ref 11.5–14.5)
GLOBULIN SER-MCNC: 3.1 G/DL (ref 2–4)
GLUCOSE SERPL-MCNC: 89 MG/DL (ref 82–115)
HCT VFR BLD AUTO: 44.5 % (ref 38–47)
HDLC SERPL-MCNC: 33 MG/DL (ref 35–60)
HGB BLD-MCNC: 14.6 G/DL (ref 12–16)
IMM GRANULOCYTES # BLD AUTO: 0.01 K/UL (ref 0–0.04)
IMM GRANULOCYTES NFR BLD: 0.1 % (ref 0–0.4)
LDLC SERPL CALC-MCNC: 148 MG/DL
LDLC/HDLC SERPL: 4.5 {RATIO}
LYMPHOCYTES # BLD AUTO: 1.87 K/UL (ref 1–4.8)
LYMPHOCYTES NFR BLD AUTO: 24.1 % (ref 27–41)
MCH RBC QN AUTO: 28.5 PG (ref 27–31)
MCHC RBC AUTO-ENTMCNC: 32.8 G/DL (ref 32–36)
MCV RBC AUTO: 86.9 FL (ref 80–96)
MOLECULAR STREP A: NEGATIVE
MONOCYTES # BLD AUTO: 0.56 K/UL (ref 0–0.8)
MONOCYTES NFR BLD AUTO: 7.2 % (ref 2–6)
MPC BLD CALC-MCNC: 10.4 FL (ref 9.4–12.4)
NEUTROPHILS # BLD AUTO: 5.17 K/UL (ref 1.8–7.7)
NEUTROPHILS NFR BLD AUTO: 66.8 % (ref 53–65)
NONHDLC SERPL-MCNC: 184 MG/DL
NRBC # BLD AUTO: 0 X10E3/UL
NRBC, AUTO (.00): 0 %
PLATELET # BLD AUTO: 262 K/UL (ref 150–400)
POTASSIUM SERPL-SCNC: 4 MMOL/L (ref 3.5–5.1)
PROT SERPL-MCNC: 7.1 G/DL (ref 5.8–7.6)
RBC # BLD AUTO: 5.12 M/UL (ref 4.2–5.4)
SODIUM SERPL-SCNC: 141 MMOL/L (ref 136–145)
TRIGL SERPL-MCNC: 182 MG/DL (ref 37–140)
VLDLC SERPL-MCNC: 36 MG/DL
WBC # BLD AUTO: 7.75 K/UL (ref 4.5–11)

## 2025-06-05 PROCEDURE — 80053 COMPREHEN METABOLIC PANEL: CPT | Mod: ,,, | Performed by: CLINICAL MEDICAL LABORATORY

## 2025-06-05 PROCEDURE — 85025 COMPLETE CBC W/AUTO DIFF WBC: CPT | Mod: ,,, | Performed by: CLINICAL MEDICAL LABORATORY

## 2025-06-05 PROCEDURE — 87651 STREP A DNA AMP PROBE: CPT | Mod: QW,,, | Performed by: NURSE PRACTITIONER

## 2025-06-05 PROCEDURE — 82306 VITAMIN D 25 HYDROXY: CPT | Mod: ,,, | Performed by: CLINICAL MEDICAL LABORATORY

## 2025-06-05 PROCEDURE — 80061 LIPID PANEL: CPT | Mod: ,,, | Performed by: CLINICAL MEDICAL LABORATORY

## 2025-06-05 RX ORDER — ASPIRIN 325 MG
50000 TABLET, DELAYED RELEASE (ENTERIC COATED) ORAL WEEKLY
Qty: 12 CAPSULE | Refills: 0 | Status: SHIPPED | OUTPATIENT
Start: 2025-06-05

## 2025-06-05 RX ORDER — DEXAMETHASONE SODIUM PHOSPHATE 4 MG/ML
6 INJECTION, SOLUTION INTRA-ARTICULAR; INTRALESIONAL; INTRAMUSCULAR; INTRAVENOUS; SOFT TISSUE
Status: COMPLETED | OUTPATIENT
Start: 2025-06-05 | End: 2025-06-05

## 2025-06-05 RX ORDER — AMOXICILLIN 500 MG/1
500 CAPSULE ORAL EVERY 12 HOURS
Qty: 20 CAPSULE | Refills: 0 | Status: SHIPPED | OUTPATIENT
Start: 2025-06-05 | End: 2025-06-15

## 2025-06-05 RX ORDER — ROSUVASTATIN CALCIUM 10 MG/1
10 TABLET, COATED ORAL DAILY
Qty: 90 TABLET | Refills: 3 | Status: SHIPPED | OUTPATIENT
Start: 2025-06-05

## 2025-06-05 RX ADMIN — DEXAMETHASONE SODIUM PHOSPHATE 6 MG: 4 INJECTION, SOLUTION INTRA-ARTICULAR; INTRALESIONAL; INTRAMUSCULAR; INTRAVENOUS; SOFT TISSUE at 08:06

## 2025-06-06 ENCOUNTER — PATIENT OUTREACH (OUTPATIENT)
Facility: HOSPITAL | Age: 60
End: 2025-06-06
Payer: COMMERCIAL

## 2025-06-24 ENCOUNTER — TELEPHONE (OUTPATIENT)
Dept: FAMILY MEDICINE | Facility: CLINIC | Age: 60
End: 2025-06-24
Payer: COMMERCIAL

## 2025-06-24 DIAGNOSIS — Z12.2 ENCOUNTER FOR SCREENING FOR LUNG CANCER: Primary | ICD-10-CM

## 2025-06-24 DIAGNOSIS — F17.210 NICOTINE DEPENDENCE, CIGARETTES, UNCOMPLICATED: Chronic | ICD-10-CM

## 2025-06-24 NOTE — TELEPHONE ENCOUNTER
I reviewed with the patient the [place] Preventative Services Task Force Recommendation on Lung Cancer Screening With Low-Dose Computed Tomography.    Patient meets eligibility criteria as per current CMS guidelines & USPTF recommendations of age 50-80 with at least a 20 pack year smoking history who currently smoke or have within the past 15 yrs.  59 y.o. current smoker   Denies s/s of lung cancer including weight loss, night sweats, increasing SOB, pain, loss of appetite, different/changing cough, or hemoptysis.  Pack Years: 21.6     CT chest in the past 12 months: no        New order entered.

## 2025-06-24 NOTE — TELEPHONE ENCOUNTER
Patient was scheduled for LDCT for today.  The test was not precerted because the order was attached to a referral from 2024 and precert stated it did not fall on their work que.  Precert is needing a new referral and patient will have to be rescheduled.

## 2025-07-15 ENCOUNTER — HOSPITAL ENCOUNTER (OUTPATIENT)
Dept: RADIOLOGY | Facility: HOSPITAL | Age: 60
Discharge: HOME | End: 2025-07-15
Attending: NURSE PRACTITIONER
Payer: COMMERCIAL

## 2025-07-15 VITALS — BODY MASS INDEX: 27.2 KG/M2 | WEIGHT: 190 LBS | HEIGHT: 70 IN

## 2025-07-15 DIAGNOSIS — F17.210 NICOTINE DEPENDENCE, CIGARETTES, UNCOMPLICATED: Chronic | ICD-10-CM

## 2025-07-15 DIAGNOSIS — Z12.2 ENCOUNTER FOR SCREENING FOR LUNG CANCER: ICD-10-CM

## 2025-07-15 PROCEDURE — 71271 CT THORAX LUNG CANCER SCR C-: CPT | Mod: 26,,, | Performed by: STUDENT IN AN ORGANIZED HEALTH CARE EDUCATION/TRAINING PROGRAM

## 2025-07-15 PROCEDURE — 71271 CT THORAX LUNG CANCER SCR C-: CPT | Mod: TC

## 2025-08-29 ENCOUNTER — PATIENT MESSAGE (OUTPATIENT)
Facility: HOSPITAL | Age: 60
End: 2025-08-29
Payer: COMMERCIAL